# Patient Record
Sex: MALE | Race: WHITE | NOT HISPANIC OR LATINO | Employment: FULL TIME | URBAN - METROPOLITAN AREA
[De-identification: names, ages, dates, MRNs, and addresses within clinical notes are randomized per-mention and may not be internally consistent; named-entity substitution may affect disease eponyms.]

---

## 2017-01-12 ENCOUNTER — TELEPHONE (OUTPATIENT)
Dept: CARDIOLOGY | Facility: CLINIC | Age: 37
End: 2017-01-12

## 2017-01-12 NOTE — TELEPHONE ENCOUNTER
Pt called in with C/O left side chest pain that wraps under arm per Pt on scale  of 0-10 is a 2, and increased PVC's.  Pt says if he is busy he can forget about it but is there all the time. Pt says pushing on it does not change and movement does not change it. Pt denies any upper respiratory no coughing no illness. Pt told if pain increases SOB feels unwell go to ER. Pt has OV now 745 1/13/17.  JUAN mckenzie RN

## 2017-01-13 ENCOUNTER — OFFICE VISIT (OUTPATIENT)
Dept: CARDIOLOGY | Facility: CLINIC | Age: 37
End: 2017-01-13
Payer: COMMERCIAL

## 2017-01-13 VITALS
SYSTOLIC BLOOD PRESSURE: 104 MMHG | HEIGHT: 76 IN | DIASTOLIC BLOOD PRESSURE: 62 MMHG | BODY MASS INDEX: 28.98 KG/M2 | HEART RATE: 62 BPM | WEIGHT: 238 LBS

## 2017-01-13 DIAGNOSIS — Z86.69 HISTORY OF CHOLESTEATOMA: ICD-10-CM

## 2017-01-13 DIAGNOSIS — I51.7 CARDIOMEGALY: ICD-10-CM

## 2017-01-13 DIAGNOSIS — R07.1 CHEST PAIN ON BREATHING: ICD-10-CM

## 2017-01-13 DIAGNOSIS — I30.0 IDIOPATHIC PERICARDITIS, UNSPECIFIED CHRONICITY: ICD-10-CM

## 2017-01-13 DIAGNOSIS — I49.3 PVC'S (PREMATURE VENTRICULAR CONTRACTIONS): Primary | ICD-10-CM

## 2017-01-13 DIAGNOSIS — R42 DIZZINESS: ICD-10-CM

## 2017-01-13 LAB
CRP SERPL-MCNC: <2.9 MG/L (ref 0–8)
ERYTHROCYTE [SEDIMENTATION RATE] IN BLOOD BY WESTERGREN METHOD: 4 MM/H (ref 0–15)

## 2017-01-13 PROCEDURE — 93000 ELECTROCARDIOGRAM COMPLETE: CPT | Performed by: INTERNAL MEDICINE

## 2017-01-13 PROCEDURE — 85652 RBC SED RATE AUTOMATED: CPT | Performed by: INTERNAL MEDICINE

## 2017-01-13 PROCEDURE — 36415 COLL VENOUS BLD VENIPUNCTURE: CPT | Performed by: INTERNAL MEDICINE

## 2017-01-13 PROCEDURE — 86140 C-REACTIVE PROTEIN: CPT | Performed by: INTERNAL MEDICINE

## 2017-01-13 PROCEDURE — 99214 OFFICE O/P EST MOD 30 MIN: CPT | Performed by: INTERNAL MEDICINE

## 2017-01-13 NOTE — PROGRESS NOTES
2017      Yarely Gonsalves, BRINDA, CNP   [facility]   [address], [address]   [place]  [address]      RE: Earnest Peterson   MRN: [id number]   : 1980      Dear Ms. Gonsalves:      I had the pleasure of seeing Earnest Peterson in Cardiology Clinic today.  He is a 36-year-old male with a past medical history of frequent premature ventricular complexes who returns for a followup.  Over the last 2 weeks he has had some mild constant chest discomfort in the retrosternal area radiating to the left chest wall.  It is pleuritic in nature and does get worse with breathing.  It does not get worse with exertion.  Today, he is having some cough.  He has had no fever or chills.  In addition, he has had more dizziness and palpitations since I last saw him.  Over the last 3 weeks, dizziness has worsened.  He attributes that to the frequent PVCs.      PHYSICAL EXAMINATION:   VITAL SIGNS:  Blood pressure 104/62, pulse 62 per minute and slightly irregular.   CARDIAC:  Regular S1, S2 with frequent PVCs.     CHEST:  Clear to auscultation.      In the past, he had an echo in 10/26 which showed LV enlargement which was mild to moderate, although may be overestimated.  EF was low normal.  We were supposed to repeat an echocardiogram this April but because of his chest pain, he came earlier.  He has had an exercise stress echocardiogram which was negative for ischemia.      EKG done today revealed sinus rhythm with monomorphic PVCs, frequent.       IMPRESSION:   1.  Chest pain.  This is pleuritic in nature.  It could be mild pleurisy or pericarditis.  He has had no fever or chills.  I will check an ESR and CRP.  I have asked him to take Motrin around the clock every 8 hours for at least 2-3 days after food.  Hopefully, that will help this pain.  I would also suggest doing a cardiac MRI, both for the PVCs and pain to see if there is any inflammation of the pericardium.   2.  Frequent PVCs.  Now he is having more  symptoms.  He is having dizziness and more fatigue.  I suggested repeating a Holter to see if the burden of PVCs had increased.  We will also do a cardiac MRI to see if there is any other etiology for the PVCs including possibility of right ventricular dysplasia and other causes.  Having said that, the echocardiogram in October did not show any RV enlargement or dysfunction.  I am also looking to see if there is any new cardiomyopathy developing because of frequent PVCs.  If that is the case, he may benefit from aggressive therapy for PVCs, including ablation.  With that in mind, I will also set up an appointment with Dr. Castano in the EP Clinic after the cardiac MRI.  If the EF is low, he may need medications like Coreg and lisinopril if his blood pressure will tolerate.      PLAN:   1.  ESR and CRP today.   2.  Cardiac MRI with contrast to assess for PVCs as well as pericardial inflammation.   3.  A 24-hour Holter.   4.  See EP after that.   5.  See me next month.      Sincerely,      MD LORRAINE Metcalf MD             D: 2017 08:29   T: 2017 12:17   MT: VICTORINO      Name:     PRITI ARECHIGA   MRN:      [id number]        Account:      [id number]   :      1980           Service Date: 2017      Document: [id number]

## 2017-01-13 NOTE — Clinical Note
2017      Yarely Gonsalves, BRINDA, CNP   [facility]   [address], [address]   [place]  [address]      RE: Earnest Peterson   MRN: [id number]   : 1980      Dear Ms. Gonsalves:      I had the pleasure of seeing Earnest Peterson in Cardiology Clinic today.  He is a 36-year-old male with a past medical history of frequent premature ventricular complexes who returns for a followup.  Over the last 2 weeks he has had some mild constant chest discomfort in the retrosternal area radiating to the left chest wall.  It is pleuritic in nature and does get worse with breathing.  It does not get worse with exertion.  Today, he is having some cough.  He has had no fever or chills.  In addition, he has had more dizziness and palpitations since I last saw him.  Over the last 3 weeks, dizziness has worsened.  He attributes that to the frequent PVCs.      PHYSICAL EXAMINATION:   VITAL SIGNS:  Blood pressure 104/62, pulse 62 per minute and slightly irregular.   CARDIAC:  Regular S1, S2 with frequent PVCs.     CHEST:  Clear to auscultation.      In the past, he had an echo in 10/26 which showed LV enlargement which was mild to moderate, although may be overestimated.  EF was low normal.  We were supposed to repeat an echocardiogram this April but because of his chest pain, he came earlier.  He has had an exercise stress echocardiogram which was negative for ischemia.      EKG done today revealed sinus rhythm with monomorphic PVCs, frequent.       IMPRESSION:   1.  Chest pain.  This is pleuritic in nature.  It could be mild pleurisy or pericarditis.  He has had no fever or chills.  I will check an ESR and CRP.  I have asked him to take Motrin around the clock every 8 hours for at least 2-3 days after food.  Hopefully, that will help this pain.  I would also suggest doing a cardiac MRI, both for the PVCs and pain to see if there is any inflammation of the pericardium.   2.  Frequent PVCs.  Now he is having more  symptoms.  He is having dizziness and more fatigue.  I suggested repeating a Holter to see if the burden of PVCs had increased.  We will also do a cardiac MRI to see if there is any other etiology for the PVCs including possibility of right ventricular dysplasia and other causes.  Having said that, the echocardiogram in October did not show any RV enlargement or dysfunction.  I am also looking to see if there is any new cardiomyopathy developing because of frequent PVCs.  If that is the case, he may benefit from aggressive therapy for PVCs, including ablation.  With that in mind, I will also set up an appointment with Dr. Castano in the EP Clinic after the cardiac MRI.  If the EF is low, he may need medications like Coreg and lisinopril if his blood pressure will tolerate.      PLAN:   1.  ESR and CRP today.   2.  Cardiac MRI with contrast to assess for PVCs as well as pericardial inflammation.   3.  A 24-hour Holter.   4.  See EP after that.   5.  See me next month.      Sincerely,      Jose Cruz Lopez MD

## 2017-01-13 NOTE — PROGRESS NOTES
HPI and Plan:   See dictation  [id number]  Orders Placed This Encounter   Procedures     MRI Cardiac w/contrast     Erythrocyte sedimentation rate auto     CRP inflammation     Follow-Up with Electrophysiologist     Follow-Up with Cardiologist     EKG 12-lead complete w/read - Clinics (performed today)     Holter Monitor 24 hour - Adult       No orders of the defined types were placed in this encounter.       Medications Discontinued During This Encounter   Medication Reason     Probiotic Product (PROBIOTIC DAILY PO) Stopped by Patient         Encounter Diagnoses   Name Primary?     PVC's (premature ventricular contractions) Yes     History of cholesteatoma      Dizziness      Cardiomegaly      Chest pain on breathing      Idiopathic pericarditis, unspecified chronicity        CURRENT MEDICATIONS:  Current Outpatient Prescriptions   Medication Sig Dispense Refill     ibuprofen (ADVIL,MOTRIN) 800 MG tablet Take 1 tablet (800 mg) by mouth every 8 hours as needed for moderate pain (Patient taking differently: Take 200 mg by mouth every 8 hours as needed for moderate pain ) 30 tablet 0       ALLERGIES     Allergies   Allergen Reactions     Nkda [No Known Drug Allergies]        PAST MEDICAL HISTORY:  Past Medical History   Diagnosis Date     Allergic rhinitis      Recurrent cholesteatoma of postmastoidectomy cavity      PVC's (premature ventricular contractions)      Sinus tachycardia      Dizziness      Lightheadedness        PAST SURGICAL HISTORY:  Past Surgical History   Procedure Laterality Date     Mastoidectomy         FAMILY HISTORY:  Family History   Problem Relation Age of Onset     DIABETES Father      type 2     Hypertension Father      Myocardial Infarction Father      HEART DISEASE Father      Heart Surgery Father 52     bypass     Arrhythmia Father      Obesity Father      KIDNEY DISEASE Mother      Myocardial Infarction Maternal Grandfather      Family History Negative Paternal Grandmother      Family History  "Negative Paternal Grandfather      Family History Negative Brother      Family History Negative Maternal Grandmother        SOCIAL HISTORY:  Social History     Social History     Marital Status:      Spouse Name: N/A     Number of Children: N/A     Years of Education: N/A     Occupational History     Sales - Software.       Social History Main Topics     Smoking status: Never Smoker      Smokeless tobacco: Never Used     Alcohol Use: Yes      Comment: 1 glass wine, 4 days week     Drug Use: No     Sexual Activity:     Partners: Female     Other Topics Concern     Parent/Sibling W/ Cabg, Mi Or Angioplasty Before 65f 55m? Yes     father     Caffeine Concern No     1 cup per week     Sleep Concern No     Stress Concern Yes     Weight Concern No     Special Diet No     Exercise Yes     runs 1-2 days week     Social History Narrative       Review of Systems:  Skin:  Negative       Eyes:  Negative      ENT:  Positive for hearing loss;nasal congestion    Respiratory:  Positive for dyspnea on exertion;cough     Cardiovascular:    Positive for;palpitations;lightheadedness;dizziness;chest pain    Gastroenterology: Negative      Genitourinary:  Negative      Musculoskeletal:  Positive for back pain lower back oain  Neurologic:  Positive for numbness or tingling of feet L leg, thinks its from his back pain  Psychiatric:  Positive for anxiety anxiety with PVC's  Heme/Lymph/Imm:  Negative allergies    Endocrine:  Negative        Physical Exam:  Vitals: /62 mmHg  Pulse 62  Ht 1.93 m (6' 4\")  Wt 107.956 kg (238 lb)  BMI 28.98 kg/m2    Constitutional:  cooperative;alert and oriented        Skin:  warm and dry to the touch        Head:  no masses or lesions        Eyes:  pupils equal and round        ENT:  dentition good        Neck:  carotid pulses are full and equal bilaterally;JVP normal        Chest:  clear to auscultation          Cardiac: regular rhythm;normal S1 and S2 occasional premature beats S4 no presence " of murmur            Abdomen:  abdomen soft;BS normoactive        Vascular: not assessed this visit                                        Extremities and Back:  no edema              Neurological:  no gross motor deficits              CC  No referring provider defined for this encounter.

## 2017-01-19 ENCOUNTER — HOSPITAL ENCOUNTER (OUTPATIENT)
Dept: CARDIOLOGY | Facility: CLINIC | Age: 37
Discharge: HOME | End: 2017-01-19
Attending: INTERNAL MEDICINE | Admitting: INTERNAL MEDICINE
Payer: COMMERCIAL

## 2017-01-19 DIAGNOSIS — I49.3 PVC'S (PREMATURE VENTRICULAR CONTRACTIONS): ICD-10-CM

## 2017-01-19 PROCEDURE — 93225 XTRNL ECG REC<48 HRS REC: CPT

## 2017-01-19 PROCEDURE — 93227 XTRNL ECG REC<48 HR R&I: CPT | Performed by: INTERNAL MEDICINE

## 2017-01-24 ENCOUNTER — HOSPITAL ENCOUNTER (OUTPATIENT)
Dept: CARDIOLOGY | Facility: CLINIC | Age: 37
Discharge: HOME | End: 2017-01-24
Attending: INTERNAL MEDICINE | Admitting: INTERNAL MEDICINE
Payer: COMMERCIAL

## 2017-01-24 DIAGNOSIS — R07.1 CHEST PAIN ON BREATHING: ICD-10-CM

## 2017-01-24 DIAGNOSIS — I30.0 IDIOPATHIC PERICARDITIS, UNSPECIFIED CHRONICITY: ICD-10-CM

## 2017-01-24 DIAGNOSIS — I49.3 PVC'S (PREMATURE VENTRICULAR CONTRACTIONS): ICD-10-CM

## 2017-01-24 PROCEDURE — 75561 CARDIAC MRI FOR MORPH W/DYE: CPT | Mod: 26 | Performed by: INTERNAL MEDICINE

## 2017-01-24 PROCEDURE — 25500064 ZZH RX 255 OP 636: Performed by: INTERNAL MEDICINE

## 2017-01-24 PROCEDURE — A9585 GADOBUTROL INJECTION: HCPCS | Performed by: INTERNAL MEDICINE

## 2017-01-24 PROCEDURE — 75561 CARDIAC MRI FOR MORPH W/DYE: CPT

## 2017-01-24 RX ORDER — GADOBUTROL 604.72 MG/ML
5-65 INJECTION INTRAVENOUS ONCE
Status: COMPLETED | OUTPATIENT
Start: 2017-01-24 | End: 2017-01-24

## 2017-01-24 RX ORDER — DIPHENHYDRAMINE HCL 25 MG
25 CAPSULE ORAL
Status: DISCONTINUED | OUTPATIENT
Start: 2017-01-24 | End: 2017-01-25 | Stop reason: HOSPADM

## 2017-01-24 RX ORDER — ACYCLOVIR 200 MG/1
0-1 CAPSULE ORAL
Status: DISCONTINUED | OUTPATIENT
Start: 2017-01-24 | End: 2017-01-25 | Stop reason: HOSPADM

## 2017-01-24 RX ORDER — METHYLPREDNISOLONE SODIUM SUCCINATE 125 MG/2ML
125 INJECTION, POWDER, LYOPHILIZED, FOR SOLUTION INTRAMUSCULAR; INTRAVENOUS
Status: DISCONTINUED | OUTPATIENT
Start: 2017-01-24 | End: 2017-01-25 | Stop reason: HOSPADM

## 2017-01-24 RX ORDER — ONDANSETRON 2 MG/ML
4 INJECTION INTRAMUSCULAR; INTRAVENOUS
Status: DISCONTINUED | OUTPATIENT
Start: 2017-01-24 | End: 2017-01-25 | Stop reason: HOSPADM

## 2017-01-24 RX ORDER — DIAZEPAM 5 MG
5 TABLET ORAL EVERY 30 MIN PRN
Status: DISCONTINUED | OUTPATIENT
Start: 2017-01-24 | End: 2017-01-25 | Stop reason: HOSPADM

## 2017-01-24 RX ORDER — DIPHENHYDRAMINE HYDROCHLORIDE 50 MG/ML
25-50 INJECTION INTRAMUSCULAR; INTRAVENOUS
Status: DISCONTINUED | OUTPATIENT
Start: 2017-01-24 | End: 2017-01-25 | Stop reason: HOSPADM

## 2017-01-24 RX ADMIN — GADOBUTROL 14 ML: 604.72 INJECTION INTRAVENOUS at 13:00

## 2017-01-25 ENCOUNTER — TELEPHONE (OUTPATIENT)
Dept: LAB | Facility: CLINIC | Age: 37
End: 2017-01-25

## 2017-01-25 DIAGNOSIS — I49.3 VENTRICULAR PREMATURE CONTRACTIONS: Primary | ICD-10-CM

## 2017-01-25 DIAGNOSIS — I49.3 VENTRICULAR PREMATURE BEATS: Primary | ICD-10-CM

## 2017-01-25 NOTE — TELEPHONE ENCOUNTER
Called Pt and informed him DR Lopez would like some further testing done, labs and chest X Ray. Orders in epic and Pt transferred to scheduling.  Pt sees DR Castano 1/31/17. JUAN Curtis RN

## 2017-01-25 NOTE — TELEPHONE ENCOUNTER
Lets draw ESR, CRP and serum angiotensin converting enzyme level as well as CXR to assess for possible sarcoid. Is his chest pain better? When does he see EP? Let him know that EF is low normal.

## 2017-01-25 NOTE — TELEPHONE ENCOUNTER
OV 3/6/17-IMPRESSION:  1.  Normal left ventricular size and low normal systolic function with  a calculated ejection fraction of  53 %. There are no regional wall  motion abnormalities.  2.  Normal right ventricular size and systolic function with a  calculated ejection fraction of 51%.    3.  There is no evidence of myocardial edema on T2-weighted imaging.  4.  On delayed enhancement imaging, there is sub-epicardial and mid  wall late enhancement involving the basal anterolateral and  inferolateral walls, the mid inferolateral/inferoseptal/anteroseptal  walls and the apical septal wall. The differential for this pattern of  enhancement would include myocarditis (given the patient's chest  discomfort) or an infiltrative cardiac process such as cardiac  sarcoidosis. Left dominant arrhythmogenic cardiomyopathy could also be  considered in the differential given the patient's frequent PVCs but  is less likely.  5.  There is no evidence of pericardial inflammation or ventricular  interdependence on free breathing cine images

## 2017-01-27 ENCOUNTER — HOSPITAL ENCOUNTER (OUTPATIENT)
Dept: GENERAL RADIOLOGY | Facility: CLINIC | Age: 37
Discharge: HOME | End: 2017-01-27
Attending: INTERNAL MEDICINE | Admitting: INTERNAL MEDICINE
Payer: COMMERCIAL

## 2017-01-27 DIAGNOSIS — I49.3 VENTRICULAR PREMATURE BEATS: ICD-10-CM

## 2017-01-27 DIAGNOSIS — I49.3 VENTRICULAR PREMATURE CONTRACTIONS: ICD-10-CM

## 2017-01-27 LAB
CRP SERPL-MCNC: 8.2 MG/L (ref 0–8)
ERYTHROCYTE [SEDIMENTATION RATE] IN BLOOD BY WESTERGREN METHOD: 6 MM/H (ref 0–15)

## 2017-01-27 PROCEDURE — 82164 ANGIOTENSIN I ENZYME TEST: CPT | Mod: 90 | Performed by: INTERNAL MEDICINE

## 2017-01-27 PROCEDURE — 86140 C-REACTIVE PROTEIN: CPT | Performed by: INTERNAL MEDICINE

## 2017-01-27 PROCEDURE — 71020 XR CHEST 2 VW: CPT

## 2017-01-27 PROCEDURE — 85652 RBC SED RATE AUTOMATED: CPT | Performed by: INTERNAL MEDICINE

## 2017-01-27 PROCEDURE — 99000 SPECIMEN HANDLING OFFICE-LAB: CPT | Performed by: INTERNAL MEDICINE

## 2017-01-27 PROCEDURE — 36415 COLL VENOUS BLD VENIPUNCTURE: CPT | Performed by: INTERNAL MEDICINE

## 2017-01-28 LAB — ACE SERPL-CCNC: 36 U/L

## 2017-01-30 ENCOUNTER — TELEPHONE (OUTPATIENT)
Dept: CARDIOLOGY | Facility: CLINIC | Age: 37
End: 2017-01-30

## 2017-01-30 NOTE — TELEPHONE ENCOUNTER
Angiotensin Converting Enzyme      36     Comment:      Reference range: 9 to 67        Component      Latest Ref Rng 1/27/2017   Sed Rate      0 - 15 mm/h 6     Component      Latest Ref Rng 1/27/2017   CRP Inflammation      0.0 - 8.0 mg/L 8.2 (H)   OV 3/6/17

## 2017-01-31 ENCOUNTER — OFFICE VISIT (OUTPATIENT)
Dept: CARDIOLOGY | Facility: CLINIC | Age: 37
End: 2017-01-31
Attending: INTERNAL MEDICINE
Payer: COMMERCIAL

## 2017-01-31 VITALS
DIASTOLIC BLOOD PRESSURE: 72 MMHG | HEIGHT: 76 IN | SYSTOLIC BLOOD PRESSURE: 104 MMHG | WEIGHT: 235 LBS | BODY MASS INDEX: 28.62 KG/M2 | HEART RATE: 72 BPM

## 2017-01-31 DIAGNOSIS — I49.3 PVC'S (PREMATURE VENTRICULAR CONTRACTIONS): ICD-10-CM

## 2017-01-31 DIAGNOSIS — R07.1 CHEST PAIN ON BREATHING: ICD-10-CM

## 2017-01-31 PROCEDURE — 99204 OFFICE O/P NEW MOD 45 MIN: CPT | Performed by: INTERNAL MEDICINE

## 2017-01-31 NOTE — PROGRESS NOTES
HPI and Plan:   See dictation    Orders Placed This Encounter   Procedures     EP Ablation Procedures       No orders of the defined types were placed in this encounter.       There are no discontinued medications.      Encounter Diagnoses   Name Primary?     PVC's (premature ventricular contractions)      Chest pain on breathing        CURRENT MEDICATIONS:  Current Outpatient Prescriptions   Medication Sig Dispense Refill     ibuprofen (ADVIL,MOTRIN) 800 MG tablet Take 1 tablet (800 mg) by mouth every 8 hours as needed for moderate pain (Patient taking differently: Take 200 mg by mouth every 8 hours as needed for moderate pain ) 30 tablet 0       ALLERGIES     Allergies   Allergen Reactions     Nkda [No Known Drug Allergies]        PAST MEDICAL HISTORY:  Past Medical History   Diagnosis Date     Allergic rhinitis      Recurrent cholesteatoma of postmastoidectomy cavity      PVC's (premature ventricular contractions)        PAST SURGICAL HISTORY:  Past Surgical History   Procedure Laterality Date     Mastoidectomy         FAMILY HISTORY:  Family History   Problem Relation Age of Onset     DIABETES Father      type 2     Hypertension Father      Myocardial Infarction Father      HEART DISEASE Father      Heart Surgery Father 52     bypass     Arrhythmia Father      Obesity Father      KIDNEY DISEASE Mother      Myocardial Infarction Maternal Grandfather      Family History Negative Paternal Grandmother      Family History Negative Paternal Grandfather      Family History Negative Brother      Family History Negative Maternal Grandmother        SOCIAL HISTORY:  Social History     Social History     Marital Status:      Spouse Name: N/A     Number of Children: N/A     Years of Education: N/A     Occupational History     Sales - Software.       Social History Main Topics     Smoking status: Never Smoker      Smokeless tobacco: Never Used     Alcohol Use: Yes      Comment: 1 glass wine, 4 days week     Drug Use:  "No     Sexual Activity:     Partners: Female     Other Topics Concern     Parent/Sibling W/ Cabg, Mi Or Angioplasty Before 65f 55m? Yes     father     Caffeine Concern No     1 cup per week     Sleep Concern No     Stress Concern Yes     Weight Concern No     Special Diet No     Exercise Yes     runs 1-2 days week     Social History Narrative       Review of Systems:  Skin:  Negative       Eyes:  Negative      ENT:  Positive for hearing loss;nasal congestion    Respiratory:  Positive for dyspnea on exertion;cough     Cardiovascular:    Positive for;palpitations;lightheadedness;dizziness;chest pain    Gastroenterology: Negative      Genitourinary:  Negative      Musculoskeletal:  Positive for back pain lower back oain  Neurologic:  Positive for numbness or tingling of feet L leg, thinks its from his back pain  Psychiatric:  Positive for anxiety anxiety with PVC's  Heme/Lymph/Imm:  Negative allergies    Endocrine:  Negative        Physical Exam:  Vitals: /72 mmHg  Pulse 72  Ht 1.93 m (6' 4\")  Wt 106.595 kg (235 lb)  BMI 28.62 kg/m2    Constitutional:  cooperative;alert and oriented        Skin:  warm and dry to the touch        Head:  no masses or lesions        Eyes:  pupils equal and round        ENT:  dentition good        Neck:  carotid pulses are full and equal bilaterally;JVP normal        Chest:  clear to auscultation          Cardiac: regular rhythm;normal S1 and S2 occasional premature beats S4 no presence of murmur            Abdomen:  abdomen soft;BS normoactive        Vascular: not assessed this visit                                        Extremities and Back:  no edema              Neurological:  no gross motor deficits              CAROLIN Lopez MD   [facility]  [address]  [address]  [place] [address]                "

## 2017-01-31 NOTE — PROGRESS NOTES
2017             BRINDA Harper, CNP   [facility]   [address]   [place] [address]       RE:    Earnest Peterson   MRN:  [id number]   :  1980      Dear Ms. Holguins:      I saw Mr. Peterson for evaluation of persistent frequent symptomatic PVCs.  He is a 36-year-old white male who had symptoms of dizziness and palpitations in early .  He was initially evaluated by Dr. Lopez and was found to have frequent PVCs.  His echocardiography showed mildly enlarged left ventricle, but the LV function is normal.  Unfortunately, his symptoms continue up to this point.  He has been tested with stress echocardiography that showed no evidence of myocardial ischemia.      The recent Holter showed PVCs over 18,000 in 24 hours.  The previous Holter in 2015 also showed PVCs over 17,000 in 24 hours.  I personally reviewed the Holter tracings and identified predominantly 1 type of PVC.  So far he has not had syncope but he did have episodes of near-syncope.      The patient does not have other medical conditions.  His family history is remarkable for his father to having coronary artery disease and atrial fibrillation.  His brother is healthy.      The patient does not smoke or abuse alcohol.  The PVCs have no apparent triggers.      PHYSICAL EXAMINATION:   VITAL SIGNS:  Blood pressure was 104/72, heart rate 72 beats per minute, body weight 235 pounds.   HEENT:  The eyes and ENT were unremarkable.   LUNGS:  Clear.   CARDIAC:  The cardiac rhythm was irregular with frequent premature contractions.  The heart sounds were normal without murmur.   ABDOMEN:  No hepatomegaly.   EXTREMITIES:  There was no pedal edema.      EKG on  showed PVCs suggesting possible RV outflow tract origin.      He is a cardiac MRI showed mild delayed enhancement over the anterior upper septal region.      ASSESSMENT AND RECOMMENDATIONS:  Mr. Peterson is a 36-year-old white male with frequent persistent symptomatic  PVCs.  The echocardiography showed mild LV enlargement, suggesting that the PVC might have caused some heart dysfunction.  His MRI showed suspicious mild delayed enhancement, but the connection of the MRI finding to his PVC remains uncertain at the present time.  Because of his young age and his personal preference, ablation is recommended.  He does not want to take lifetime medications.  The risks and benefits of PVC ablation were explained to the patient who expressed understanding and consented for the procedure.      Sincerely,         MD JANICE Mora MD             D: 2017 10:46   T: 2017 12:00   MT: SOTERO      Name:     PRITI ARECHIGA   MRN:      [id number]        Account:      [id number]   :      1980           Service Date: 2017      Document: [id number]

## 2017-01-31 NOTE — Clinical Note
2017             BRINDA Harper, CNP   [facility]   [address]   [place] [address]       RE:    Earnest Peterson   MRN:  [id number]   :  1980      Dear Ms. Holguins:      I saw Mr. Peterson for evaluation of persistent frequent symptomatic PVCs.  He is a 36-year-old white male who had symptoms of dizziness and palpitations in early .  He was initially evaluated by Dr. Lopez and was found to have frequent PVCs.  His echocardiography showed mildly enlarged left ventricle, but the LV function is normal.  Unfortunately, his symptoms continue up to this point.  He has been tested with stress echocardiography that showed no evidence of myocardial ischemia.      The recent Holter showed PVCs over 18,000 in 24 hours.  The previous Holter in 2015 also showed PVCs over 17,000 in 24 hours.  I personally reviewed the Holter tracings and identified predominantly 1 type of PVC.  So far he has not had syncope but he did have episodes of near-syncope.      The patient does not have other medical conditions.  His family history is remarkable for his father to having coronary artery disease and atrial fibrillation.  His brother is healthy.      The patient does not smoke or abuse alcohol.  The PVCs have no apparent triggers.      PHYSICAL EXAMINATION:   VITAL SIGNS:  Blood pressure was 104/72, heart rate 72 beats per minute, body weight 235 pounds.   HEENT:  The eyes and ENT were unremarkable.   LUNGS:  Clear.   CARDIAC:  The cardiac rhythm was irregular with frequent premature contractions.  The heart sounds were normal without murmur.   ABDOMEN:  No hepatomegaly.   EXTREMITIES:  There was no pedal edema.      EKG on  showed PVCs suggesting possible RV outflow tract origin.      He is a cardiac MRI showed mild delayed enhancement over the anterior upper septal region.      ASSESSMENT AND RECOMMENDATIONS:  Mr. Peterson is a 36-year-old white male with frequent persistent symptomatic  PVCs.  The echocardiography showed mild LV enlargement, suggesting that the PVC might have caused some heart dysfunction.  His MRI showed suspicious mild delayed enhancement, but the connection of the MRI finding to his PVC remains uncertain at the present time.  Because of his young age and his personal preference, ablation is recommended.  He does not want to take lifetime medications.  The risks and benefits of PVC ablation were explained to the patient who expressed understanding and consented for the procedure.      Sincerely,         Hugo Castano MD

## 2017-02-07 ENCOUNTER — TELEPHONE (OUTPATIENT)
Dept: CARDIOLOGY | Facility: CLINIC | Age: 37
End: 2017-02-07

## 2017-02-07 NOTE — TELEPHONE ENCOUNTER
Called Pt and reviewed lab results. CRP elevated other labs normal- Pt has OV with DR Lopez schedule Mar 6 two days before scheduled ablation. Will message  If he still wants to see Pt and any recommendations regarding elevated CRP.  CRP Inflammation 8.2 (H) 0.0 - 8.0 mg/L   JUAN Curtis RN

## 2017-02-08 DIAGNOSIS — I49.3 MULTIFOCAL PVCS: Primary | ICD-10-CM

## 2017-02-08 DIAGNOSIS — I49.3 PVC'S (PREMATURE VENTRICULAR CONTRACTIONS): Primary | ICD-10-CM

## 2017-02-08 NOTE — TELEPHONE ENCOUNTER
Called Pt left message informing him would like to recheck CRP and order in epic for him to arrange lab. JUAN Curtis RN

## 2017-02-23 ENCOUNTER — TELEPHONE (OUTPATIENT)
Dept: CARDIOLOGY | Facility: CLINIC | Age: 37
End: 2017-02-23

## 2017-02-23 DIAGNOSIS — I49.3 PVC'S (PREMATURE VENTRICULAR CONTRACTIONS): ICD-10-CM

## 2017-02-23 LAB — CRP SERPL-MCNC: <2.9 MG/L (ref 0–8)

## 2017-02-23 PROCEDURE — 36415 COLL VENOUS BLD VENIPUNCTURE: CPT | Performed by: INTERNAL MEDICINE

## 2017-02-23 PROCEDURE — 86140 C-REACTIVE PROTEIN: CPT | Performed by: INTERNAL MEDICINE

## 2017-02-23 NOTE — TELEPHONE ENCOUNTER
Informed Pt that CRP back to normal. Pt still has pain with deep breaths under left arm wrapping to back but nothing constant and quite tolerable. Pt asking if he still needs OV with DR Lopez 3/6/17. Pt's ablation is 3/8/16. JUAN Curtis RN

## 2017-03-06 ENCOUNTER — OFFICE VISIT (OUTPATIENT)
Dept: CARDIOLOGY | Facility: CLINIC | Age: 37
End: 2017-03-06
Attending: INTERNAL MEDICINE
Payer: COMMERCIAL

## 2017-03-06 VITALS
DIASTOLIC BLOOD PRESSURE: 75 MMHG | HEIGHT: 76 IN | WEIGHT: 237 LBS | HEART RATE: 80 BPM | BODY MASS INDEX: 28.86 KG/M2 | SYSTOLIC BLOOD PRESSURE: 130 MMHG

## 2017-03-06 DIAGNOSIS — R07.2 PRECORDIAL PAIN: ICD-10-CM

## 2017-03-06 DIAGNOSIS — I49.3 PVC'S (PREMATURE VENTRICULAR CONTRACTIONS): Primary | ICD-10-CM

## 2017-03-06 PROCEDURE — 99214 OFFICE O/P EST MOD 30 MIN: CPT | Performed by: INTERNAL MEDICINE

## 2017-03-06 NOTE — PROGRESS NOTES
HPI and Plan:   See dictation  [id number]  Orders Placed This Encounter   Procedures     Follow-Up with Cardiologist       No orders of the defined types were placed in this encounter.      There are no discontinued medications.      Encounter Diagnoses   Name Primary?     PVC's (premature ventricular contractions) Yes     Precordial pain        CURRENT MEDICATIONS:  Current Outpatient Prescriptions   Medication Sig Dispense Refill     ibuprofen (ADVIL,MOTRIN) 800 MG tablet Take 1 tablet (800 mg) by mouth every 8 hours as needed for moderate pain (Patient taking differently: Take 200 mg by mouth every 8 hours as needed for moderate pain ) 30 tablet 0       ALLERGIES     Allergies   Allergen Reactions     Nkda [No Known Drug Allergies]        PAST MEDICAL HISTORY:  Past Medical History   Diagnosis Date     Allergic rhinitis      PVC's (premature ventricular contractions)      Recurrent cholesteatoma of postmastoidectomy cavity        PAST SURGICAL HISTORY:  Past Surgical History   Procedure Laterality Date     Mastoidectomy         FAMILY HISTORY:  Family History   Problem Relation Age of Onset     DIABETES Father      type 2     Hypertension Father      Myocardial Infarction Father      HEART DISEASE Father      Heart Surgery Father 52     bypass     Arrhythmia Father      Obesity Father      KIDNEY DISEASE Mother      Myocardial Infarction Maternal Grandfather      Family History Negative Paternal Grandmother      Family History Negative Paternal Grandfather      Family History Negative Brother      Family History Negative Maternal Grandmother        SOCIAL HISTORY:  Social History     Social History     Marital status:      Spouse name: N/A     Number of children: N/A     Years of education: N/A     Occupational History     Sales - Software.       Social History Main Topics     Smoking status: Never Smoker     Smokeless tobacco: Never Used     Alcohol use Yes      Comment: 1 glass wine, 4 days week     Drug  "use: No     Sexual activity: Yes     Partners: Female     Other Topics Concern     Parent/Sibling W/ Cabg, Mi Or Angioplasty Before 65f 55m? Yes     father     Caffeine Concern No     1 cup per week     Sleep Concern No     Stress Concern Yes     Weight Concern No     Special Diet No     Exercise Yes     runs 1-2 days week     Social History Narrative       Review of Systems:  Skin:  Negative       Eyes:  Negative      ENT:  Positive for hearing loss;nasal congestion    Respiratory:  Positive for dyspnea on exertion;cough     Cardiovascular:    Positive for;palpitations;lightheadedness;dizziness    Gastroenterology: Negative      Genitourinary:  Negative      Musculoskeletal:  Positive for back pain lower back oain  Neurologic:  Positive for numbness or tingling of feet L leg, thinks its from his back pain  Psychiatric:  Positive for anxiety anxiety with PVC's  Heme/Lymph/Imm:  Negative allergies    Endocrine:  Negative        Physical Exam:  Vitals: /75  Pulse 80  Ht 1.93 m (6' 4\")  Wt 107.5 kg (237 lb)  BMI 28.85 kg/m2    Constitutional:  cooperative;alert and oriented        Skin:  warm and dry to the touch        Head:  no masses or lesions        Eyes:  pupils equal and round        ENT:  dentition good        Neck:  carotid pulses are full and equal bilaterally;JVP normal        Chest:  clear to auscultation          Cardiac: regular rhythm;normal S1 and S2 occasional premature beats S4 no presence of murmur            Abdomen:  abdomen soft;BS normoactive        Vascular: not assessed this visit                                        Extremities and Back:  no edema              Neurological:  no gross motor deficits              CAROLIN Lopez MD   [facility]  [address]  [address]  [place] [address]              "

## 2017-03-06 NOTE — LETTER
01-Mar-2017 13:55 3/6/2017    Yarely Gonsalves, BRINDA CNP  [facility]   [address] [facility] [address]  [facility] [address]    RE: Earnest KHARI Peterson       Dear Colleague,    I had the pleasure of seeing Earnest Peterson in Cardiology Clinic for symptomatic PVCs.  He has had PVCs for now more than a year.  He has had echocardiography done which showed low normal ejection fraction with mildly enlarged left ventricle.  He has also had almost atypical constant chest pain on the left side, occasionally increasing on taking a deep breath.  I did a cardiac MRI because of frequency of PVCs, which showed EF of 53% with normal LV size.  However, there was evidence of delayed enhancement that was atypical consistent with either previous myocarditis, sarcoid was in the differential as was left-sided arrhythmogenic cardiomyopathy, although the reader felt the latter was less likely.  We did ESR and CRP.  ESR was normal, and CRP was borderline elevated, but repeat CRP came back normal.  This was done to see if there is any evidence of active inflammation.  I also did ACE enzyme assessment for possible sarcoid as well as chest x-ray, which both were negative.  At this time, patient tells me that he still feels his PVCs.  He has seen my partner, Dr. Castano, who has recommended ablation given his symptoms as well as mild LV enlargement on echo, although not confirmed on MRI.  I gave him the results of the ESR, CRP, which are now normal.  He has taken ibuprofen intermittently, which occasionally helped the pain.  However, now in retrospect, he believes that some of the pain is related to how he sleeps.  He was on vacation last week and had a different bed, and his pain got worse because the bed was not comfortable.  For the last few days, his pain does not get worse with deep breathing.      PHYSICAL EXAMINATION:   VITAL SIGNS:  Blood pressure 130/75, pulse 80 per minute with slight irregularity due to PVCs.   CHEST:  Clear to auscultation.     Outpatient  Encounter Prescriptions as of 3/6/2017   Medication Sig Dispense Refill     ibuprofen (ADVIL,MOTRIN) 800 MG tablet Take 1 tablet (800 mg) by mouth every 8 hours as needed for moderate pain (Patient taking differently: Take 200 mg by mouth every 8 hours as needed for moderate pain ) 30 tablet 0     No facility-administered encounter medications on file as of 3/6/2017.       IMPRESSION:   1.  Symptomatic PVCs.  The patient is aware of the symptoms, has some tiredness, fatigue and had mild LV enlargement on the last echocardiogram, although the MRI showed normal LV size with low normal EF.  Given the symptoms, he has been advised ablation by Dr. Castano and it is being performed on Wednesday.  At this time, his ESR and CRP are normal and therefore no evidence of active inflammation.  We did do a limited workup for possible sarcoid including chest x-ray that did not show any lymphadenopathy.  There was also an ACE enzyme done which was within normal limits.  In any case, his MRI shows delayed enhancement, which is scarring and nothing active.  I did bring up the possibility of occasionally doing a heart biopsy for further assessment, but he is not interested.  At this time, we will proceed with the ablation per Dr. Castano's recommendation.  I told him Dr. Castano will see him after for followup to see how he is doing.  I would like to see him back in followup in 4 months.  I did review with him the previous stress echocardiogram in 2015 which showed no ischemia and the PVCs improved with peak exercise.      Thank you for allowing us to participate in the care of this nice patient.  At this time, we will see him back in followup in 4 months.  We will continue to follow with Dr. Castano for ablation and subsequent clinic visits.     Sincerely,    Jose Cruz Lopez MD     [facility]

## 2017-03-06 NOTE — PROGRESS NOTES
HISTORY OF PRESENT ILLNESS:  I had the pleasure of seeing Earnest Peterson in Cardiology Clinic for symptomatic PVCs.  He has had PVCs for now more than a year.  He has had echocardiography done which showed low normal ejection fraction with mildly enlarged left ventricle.  He has also had almost atypical constant chest pain on the left side, occasionally increasing on taking a deep breath.  I did a cardiac MRI because of frequency of PVCs, which showed EF of 53% with normal LV size.  However, there was evidence of delayed enhancement that was atypical consistent with either previous myocarditis, sarcoid was in the differential as was left-sided arrhythmogenic cardiomyopathy, although the reader felt the latter was less likely.  We did ESR and CRP.  ESR was normal, and CRP was borderline elevated, but repeat CRP came back normal.  This was done to see if there is any evidence of active inflammation.  I also did ACE enzyme assessment for possible sarcoid as well as chest x-ray, which both were negative.  At this time, patient tells me that he still feels his PVCs.  He has seen my partner, Dr. Castano, who has recommended ablation given his symptoms as well as mild LV enlargement on echo, although not confirmed on MRI.  I gave him the results of the ESR, CRP, which are now normal.  He has taken ibuprofen intermittently, which occasionally helped the pain.  However, now in retrospect, he believes that some of the pain is related to how he sleeps.  He was on vacation last week and had a different bed, and his pain got worse because the bed was not comfortable.  For the last few days, his pain does not get worse with deep breathing.      PHYSICAL EXAMINATION:   VITAL SIGNS:  Blood pressure 130/75, pulse 80 per minute with slight irregularity due to PVCs.   CHEST:  Clear to auscultation.      IMPRESSION:   1.  Symptomatic PVCs.  The patient is aware of the symptoms, has some tiredness, fatigue and had mild LV enlargement on the  last echocardiogram, although the MRI showed normal LV size with low normal EF.  Given the symptoms, he has been advised ablation by Dr. Castano and it is being performed on Wednesday.  At this time, his ESR and CRP are normal and therefore no evidence of active inflammation.  We did do a limited workup for possible sarcoid including chest x-ray that did not show any lymphadenopathy.  There was also an ACE enzyme done which was within normal limits.  In any case, his MRI shows delayed enhancement, which is scarring and nothing active.  I did bring up the possibility of occasionally doing a heart biopsy for further assessment, but he is not interested.  At this time, we will proceed with the ablation per Dr. Castano's recommendation.  I told him Dr. Castano will see him after for followup to see how he is doing.  I would like to see him back in followup in 4 months.  I did review with him the previous stress echocardiogram in  which showed no ischemia and the PVCs improved with peak exercise.      Thank you for allowing us to participate in the care of this nice patient.  At this time, we will see him back in followup in 4 months.  We will continue to follow with Dr. Castano for ablation and subsequent clinic visits.      cc:   BRINDA Harper, CNP    [address], [address]    [place]  [address]         LORRAINE TRINIDAD MD             D: 2017 08:50   T: 2017 13:45   MT: NELSON      Name:     PRITI ARECHIGA   MRN:      [id number]        Account:      [id number]   :      1980           Service Date: 2017      Document: [id number]

## 2017-03-06 NOTE — MR AVS SNAPSHOT
After Visit Summary   3/6/2017    Earnest Peterson    MRN: [id number]           Patient Information     Date Of Birth          1980        Visit Information        Provider Department      3/6/2017 8:15 AM Jose Cruz Lopez MD [facility] [place]        Today's Diagnoses     PVC's (premature ventricular contractions)    -  1    Precordial pain           Follow-ups after your visit        Additional Services     Follow-Up with Cardiologist                 Your next 10 appointments already scheduled     Mar 08, 2017  8:30 AM CST   Ep 90 Minute with SHCVR3   [facility] ([facility])    [address]  [place] [address]   [phone]              Future tests that were ordered for you today     Open Future Orders        Priority Expected Expires Ordered    Follow-Up with Cardiologist Routine 7/4/2017 3/6/2018 3/6/2017            Who to contact     If you have questions or need follow up information about today's clinic visit or your schedule please contact [facility] directly at [phone].  Normal or non-critical lab and imaging results will be communicated to you by MobiKwikhart, letter or phone within 4 business days after the clinic has received the results. If you do not hear from us within 7 days, please contact the clinic through MobiKwikhart or phone. If you have a critical or abnormal lab result, we will notify you by phone as soon as possible.  Submit refill requests through RoomReveal or call your pharmacy and they will forward the refill request to us. Please allow 3 business days for your refill to be completed.          Additional Information About Your Visit        MobiKwikhart Information     RoomReveal gives you secure access to your electronic health record. If you see a primary care provider, you can also send messages to your care team and make appointments. If you  "have questions, please call your primary care clinic.  If you do not have a primary care provider, please call [phone] and they will assist you.        Care EveryWhere ID     This is your Care EveryWhere ID. This could be used by other organizations to access your [place] medical records  [id number]        Your Vitals Were     Pulse Height BMI (Body Mass Index)             80 1.93 m (6' 4\") 28.85 kg/m2          Blood Pressure from Last 3 Encounters:   03/06/17 130/75   01/31/17 104/72   01/13/17 104/62    Weight from Last 3 Encounters:   03/06/17 107.5 kg (237 lb)   01/31/17 106.6 kg (235 lb)   01/13/17 108 kg (238 lb)              We Performed the Following     Follow-Up with Cardiologist          Today's Medication Changes          These changes are accurate as of: 3/6/17  8:52 AM.  If you have any questions, ask your nurse or doctor.               These medicines have changed or have updated prescriptions.        Dose/Directions    ibuprofen 800 MG tablet   Commonly known as:  ADVIL/MOTRIN   This may have changed:  how much to take   Used for:  Upper back pain on left side        Dose:  800 mg   Take 1 tablet (800 mg) by mouth every 8 hours as needed for moderate pain   Quantity:  30 tablet   Refills:  0                Primary Care Provider Office Phone # Fax #    BRINDA Olson [facility] [phone]       [facility] [address] [address]        Thank you!     Thank you for choosing [facility] [place]  for your care. Our goal is always to provide you with excellent care. Hearing back from our patients is one way we can continue to improve our services. Please take a few minutes to complete the written survey that you may receive in the mail after your visit with us. Thank you!             Your Updated Medication List - Protect others around you: Learn how to safely use, store and throw away your medicines at www.disposemymeds.org. "          This list is accurate as of: 3/6/17  8:52 AM.  Always use your most recent med list.                   Brand Name Dispense Instructions for use    ibuprofen 800 MG tablet    ADVIL/MOTRIN    30 tablet    Take 1 tablet (800 mg) by mouth every 8 hours as needed for moderate pain

## 2017-03-07 DIAGNOSIS — I49.3 PVC'S (PREMATURE VENTRICULAR CONTRACTIONS): Primary | ICD-10-CM

## 2017-03-07 RX ORDER — LIDOCAINE 40 MG/G
CREAM TOPICAL
Status: CANCELLED | OUTPATIENT
Start: 2017-03-07

## 2017-03-07 RX ORDER — SODIUM CHLORIDE 450 MG/100ML
INJECTION, SOLUTION INTRAVENOUS CONTINUOUS
Status: CANCELLED | OUTPATIENT
Start: 2017-03-07

## 2017-03-08 ENCOUNTER — APPOINTMENT (OUTPATIENT)
Dept: CARDIOLOGY | Facility: CLINIC | Age: 37
End: 2017-03-08
Attending: INTERNAL MEDICINE
Payer: COMMERCIAL

## 2017-03-08 ENCOUNTER — HOSPITAL ENCOUNTER (OUTPATIENT)
Facility: CLINIC | Age: 37
Discharge: HOME | End: 2017-03-08
Attending: INTERNAL MEDICINE | Admitting: INTERNAL MEDICINE
Payer: COMMERCIAL

## 2017-03-08 VITALS
SYSTOLIC BLOOD PRESSURE: 126 MMHG | OXYGEN SATURATION: 96 % | DIASTOLIC BLOOD PRESSURE: 81 MMHG | RESPIRATION RATE: 16 BRPM | BODY MASS INDEX: 29.05 KG/M2 | TEMPERATURE: 96.9 F | WEIGHT: 233.6 LBS | HEIGHT: 75 IN

## 2017-03-08 DIAGNOSIS — I49.3 PVC'S (PREMATURE VENTRICULAR CONTRACTIONS): ICD-10-CM

## 2017-03-08 LAB
ANION GAP SERPL CALCULATED.3IONS-SCNC: 7 MMOL/L (ref 3–14)
BUN SERPL-MCNC: 16 MG/DL (ref 7–30)
CALCIUM SERPL-MCNC: 8.9 MG/DL (ref 8.5–10.1)
CHLORIDE SERPL-SCNC: 107 MMOL/L (ref 94–109)
CO2 SERPL-SCNC: 28 MMOL/L (ref 20–32)
CREAT SERPL-MCNC: 0.86 MG/DL (ref 0.66–1.25)
ERYTHROCYTE [DISTWIDTH] IN BLOOD BY AUTOMATED COUNT: 13.6 % (ref 10–15)
GFR SERPL CREATININE-BSD FRML MDRD: NORMAL ML/MIN/1.7M2
GLUCOSE SERPL-MCNC: 89 MG/DL (ref 70–99)
HCT VFR BLD AUTO: 44.1 % (ref 40–53)
HGB BLD-MCNC: 15.1 G/DL (ref 13.3–17.7)
MCH RBC QN AUTO: 28 PG (ref 26.5–33)
MCHC RBC AUTO-ENTMCNC: 34.2 G/DL (ref 31.5–36.5)
MCV RBC AUTO: 82 FL (ref 78–100)
PLATELET # BLD AUTO: 163 10E9/L (ref 150–450)
POTASSIUM SERPL-SCNC: 3.9 MMOL/L (ref 3.4–5.3)
RBC # BLD AUTO: 5.39 10E12/L (ref 4.4–5.9)
SODIUM SERPL-SCNC: 142 MMOL/L (ref 133–144)
WBC # BLD AUTO: 6 10E9/L (ref 4–11)

## 2017-03-08 PROCEDURE — 40000065 ZZH STATISTIC EKG NON-CHARGEABLE

## 2017-03-08 PROCEDURE — 27210795 ZZH PAD DEFIB QUICK CR4

## 2017-03-08 PROCEDURE — 025K3ZZ DESTRUCTION OF RIGHT VENTRICLE, PERCUTANEOUS APPROACH: ICD-10-PCS | Performed by: INTERNAL MEDICINE

## 2017-03-08 PROCEDURE — 93654 COMPRE EP EVAL TX VT: CPT

## 2017-03-08 PROCEDURE — 4A023FZ MEASUREMENT OF CARDIAC RHYTHM, PERCUTANEOUS APPROACH: ICD-10-PCS | Performed by: INTERNAL MEDICINE

## 2017-03-08 PROCEDURE — 99152 MOD SED SAME PHYS/QHP 5/>YRS: CPT | Performed by: INTERNAL MEDICINE

## 2017-03-08 PROCEDURE — C1732 CATH, EP, DIAG/ABL, 3D/VECT: HCPCS

## 2017-03-08 PROCEDURE — 25000125 ZZHC RX 250: Performed by: INTERNAL MEDICINE

## 2017-03-08 PROCEDURE — 99152 MOD SED SAME PHYS/QHP 5/>YRS: CPT

## 2017-03-08 PROCEDURE — 93010 ELECTROCARDIOGRAM REPORT: CPT | Mod: 76 | Performed by: INTERNAL MEDICINE

## 2017-03-08 PROCEDURE — 25000134 H RX MED IP 250 OP 636 PS 250: Performed by: INTERNAL MEDICINE

## 2017-03-08 PROCEDURE — 3E033KZ INTRODUCTION OF OTHER DIAGNOSTIC SUBSTANCE INTO PERIPHERAL VEIN, PERCUTANEOUS APPROACH: ICD-10-PCS | Performed by: INTERNAL MEDICINE

## 2017-03-08 PROCEDURE — 27210782 ZZH KIT EP TOTES DISP CR7

## 2017-03-08 PROCEDURE — 93623 PRGRMD STIMJ&PACG IV RX NFS: CPT | Mod: 26 | Performed by: INTERNAL MEDICINE

## 2017-03-08 PROCEDURE — 27210796 ZZH PAD EXTRNAL REFRENCE CARDIAC MAPPING CR14

## 2017-03-08 PROCEDURE — 02K83ZZ MAP CONDUCTION MECHANISM, PERCUTANEOUS APPROACH: ICD-10-PCS | Performed by: INTERNAL MEDICINE

## 2017-03-08 PROCEDURE — 99153 MOD SED SAME PHYS/QHP EA: CPT | Performed by: INTERNAL MEDICINE

## 2017-03-08 PROCEDURE — 40000235 ZZH STATISTIC TELEMETRY

## 2017-03-08 PROCEDURE — 25000128 H RX IP 250 OP 636: Performed by: INTERNAL MEDICINE

## 2017-03-08 PROCEDURE — 40000852 ZZH STATISTIC HEART CATH LAB OR EP LAB

## 2017-03-08 PROCEDURE — 93623 PRGRMD STIMJ&PACG IV RX NFS: CPT

## 2017-03-08 PROCEDURE — 99153 MOD SED SAME PHYS/QHP EA: CPT

## 2017-03-08 PROCEDURE — 85027 COMPLETE CBC AUTOMATED: CPT | Performed by: INTERNAL MEDICINE

## 2017-03-08 PROCEDURE — 27210995 ZZH RX 272: Performed by: INTERNAL MEDICINE

## 2017-03-08 PROCEDURE — 93005 ELECTROCARDIOGRAM TRACING: CPT

## 2017-03-08 PROCEDURE — 80048 BASIC METABOLIC PNL TOTAL CA: CPT | Performed by: INTERNAL MEDICINE

## 2017-03-08 PROCEDURE — 93654 COMPRE EP EVAL TX VT: CPT | Performed by: INTERNAL MEDICINE

## 2017-03-08 RX ORDER — SODIUM CHLORIDE 450 MG/100ML
INJECTION, SOLUTION INTRAVENOUS CONTINUOUS
Status: DISCONTINUED | OUTPATIENT
Start: 2017-03-08 | End: 2017-03-08 | Stop reason: HOSPADM

## 2017-03-08 RX ORDER — DIPHENHYDRAMINE HYDROCHLORIDE 50 MG/ML
25-50 INJECTION INTRAMUSCULAR; INTRAVENOUS
Status: DISCONTINUED | OUTPATIENT
Start: 2017-03-08 | End: 2017-03-08 | Stop reason: HOSPADM

## 2017-03-08 RX ORDER — PROTAMINE SULFATE 10 MG/ML
1-5 INJECTION, SOLUTION INTRAVENOUS
Status: DISCONTINUED | OUTPATIENT
Start: 2017-03-08 | End: 2017-03-08 | Stop reason: HOSPADM

## 2017-03-08 RX ORDER — PROTAMINE SULFATE 10 MG/ML
5-40 INJECTION, SOLUTION INTRAVENOUS EVERY 10 MIN PRN
Status: DISCONTINUED | OUTPATIENT
Start: 2017-03-08 | End: 2017-03-08 | Stop reason: HOSPADM

## 2017-03-08 RX ORDER — LIDOCAINE 40 MG/G
CREAM TOPICAL
Status: DISCONTINUED | OUTPATIENT
Start: 2017-03-08 | End: 2017-03-08 | Stop reason: HOSPADM

## 2017-03-08 RX ORDER — LIDOCAINE HYDROCHLORIDE 10 MG/ML
10-30 INJECTION, SOLUTION EPIDURAL; INFILTRATION; INTRACAUDAL; PERINEURAL
Status: DISCONTINUED | OUTPATIENT
Start: 2017-03-08 | End: 2017-03-08 | Stop reason: HOSPADM

## 2017-03-08 RX ORDER — FENTANYL CITRATE 50 UG/ML
25-50 INJECTION, SOLUTION INTRAMUSCULAR; INTRAVENOUS
Status: DISCONTINUED | OUTPATIENT
Start: 2017-03-08 | End: 2017-03-08 | Stop reason: HOSPADM

## 2017-03-08 RX ORDER — IBUTILIDE FUMARATE 1 MG/10ML
1 INJECTION, SOLUTION INTRAVENOUS
Status: DISCONTINUED | OUTPATIENT
Start: 2017-03-08 | End: 2017-03-08 | Stop reason: HOSPADM

## 2017-03-08 RX ORDER — IBUTILIDE FUMARATE 1 MG/10ML
0.01 INJECTION, SOLUTION INTRAVENOUS
Status: DISCONTINUED | OUTPATIENT
Start: 2017-03-08 | End: 2017-03-08 | Stop reason: HOSPADM

## 2017-03-08 RX ORDER — PROMETHAZINE HYDROCHLORIDE 25 MG/ML
6.25-25 INJECTION, SOLUTION INTRAMUSCULAR; INTRAVENOUS EVERY 4 HOURS PRN
Status: DISCONTINUED | OUTPATIENT
Start: 2017-03-08 | End: 2017-03-08 | Stop reason: HOSPADM

## 2017-03-08 RX ORDER — LIDOCAINE HYDROCHLORIDE AND EPINEPHRINE 10; 10 MG/ML; UG/ML
10-30 INJECTION, SOLUTION INFILTRATION; PERINEURAL
Status: DISCONTINUED | OUTPATIENT
Start: 2017-03-08 | End: 2017-03-08 | Stop reason: HOSPADM

## 2017-03-08 RX ORDER — FUROSEMIDE 10 MG/ML
20-100 INJECTION INTRAMUSCULAR; INTRAVENOUS
Status: DISCONTINUED | OUTPATIENT
Start: 2017-03-08 | End: 2017-03-08 | Stop reason: HOSPADM

## 2017-03-08 RX ORDER — LIDOCAINE HYDROCHLORIDE 10 MG/ML
10-30 INJECTION, SOLUTION EPIDURAL; INFILTRATION; INTRACAUDAL; PERINEURAL
Status: COMPLETED | OUTPATIENT
Start: 2017-03-08 | End: 2017-03-08

## 2017-03-08 RX ORDER — NALOXONE HYDROCHLORIDE 0.4 MG/ML
.1-.4 INJECTION, SOLUTION INTRAMUSCULAR; INTRAVENOUS; SUBCUTANEOUS
Status: DISCONTINUED | OUTPATIENT
Start: 2017-03-08 | End: 2017-03-08 | Stop reason: HOSPADM

## 2017-03-08 RX ORDER — BUPIVACAINE HYDROCHLORIDE 2.5 MG/ML
10-30 INJECTION, SOLUTION EPIDURAL; INFILTRATION; INTRACAUDAL
Status: DISCONTINUED | OUTPATIENT
Start: 2017-03-08 | End: 2017-03-08 | Stop reason: HOSPADM

## 2017-03-08 RX ORDER — MORPHINE SULFATE 2 MG/ML
1-2 INJECTION, SOLUTION INTRAMUSCULAR; INTRAVENOUS EVERY 5 MIN PRN
Status: DISCONTINUED | OUTPATIENT
Start: 2017-03-08 | End: 2017-03-08 | Stop reason: HOSPADM

## 2017-03-08 RX ORDER — ONDANSETRON 2 MG/ML
4 INJECTION INTRAMUSCULAR; INTRAVENOUS EVERY 4 HOURS PRN
Status: DISCONTINUED | OUTPATIENT
Start: 2017-03-08 | End: 2017-03-08 | Stop reason: HOSPADM

## 2017-03-08 RX ORDER — HEPARIN SODIUM 1000 [USP'U]/ML
1000-10000 INJECTION, SOLUTION INTRAVENOUS; SUBCUTANEOUS EVERY 5 MIN PRN
Status: DISCONTINUED | OUTPATIENT
Start: 2017-03-08 | End: 2017-03-08 | Stop reason: HOSPADM

## 2017-03-08 RX ORDER — ADENOSINE 3 MG/ML
6-12 INJECTION, SOLUTION INTRAVENOUS EVERY 5 MIN PRN
Status: DISCONTINUED | OUTPATIENT
Start: 2017-03-08 | End: 2017-03-08 | Stop reason: HOSPADM

## 2017-03-08 RX ORDER — KETOROLAC TROMETHAMINE 30 MG/ML
15-30 INJECTION, SOLUTION INTRAMUSCULAR; INTRAVENOUS
Status: DISCONTINUED | OUTPATIENT
Start: 2017-03-08 | End: 2017-03-08 | Stop reason: HOSPADM

## 2017-03-08 RX ORDER — LORAZEPAM 2 MG/ML
.5-2 INJECTION INTRAMUSCULAR EVERY 10 MIN PRN
Status: DISCONTINUED | OUTPATIENT
Start: 2017-03-08 | End: 2017-03-08 | Stop reason: HOSPADM

## 2017-03-08 RX ORDER — FLUMAZENIL 0.1 MG/ML
0.2 INJECTION, SOLUTION INTRAVENOUS
Status: DISCONTINUED | OUTPATIENT
Start: 2017-03-08 | End: 2017-03-08 | Stop reason: HOSPADM

## 2017-03-08 RX ORDER — NALOXONE HYDROCHLORIDE 0.4 MG/ML
0.4 INJECTION, SOLUTION INTRAMUSCULAR; INTRAVENOUS; SUBCUTANEOUS EVERY 5 MIN PRN
Status: DISCONTINUED | OUTPATIENT
Start: 2017-03-08 | End: 2017-03-08 | Stop reason: HOSPADM

## 2017-03-08 RX ORDER — DOBUTAMINE HYDROCHLORIDE 200 MG/100ML
5-40 INJECTION INTRAVENOUS CONTINUOUS PRN
Status: DISCONTINUED | OUTPATIENT
Start: 2017-03-08 | End: 2017-03-08 | Stop reason: HOSPADM

## 2017-03-08 RX ADMIN — SODIUM CHLORIDE: 4.5 INJECTION, SOLUTION INTRAVENOUS at 07:15

## 2017-03-08 RX ADMIN — FENTANYL CITRATE 50 MCG: 50 INJECTION, SOLUTION INTRAMUSCULAR; INTRAVENOUS at 09:11

## 2017-03-08 RX ADMIN — MIDAZOLAM HYDROCHLORIDE 1 MG: 1 INJECTION, SOLUTION INTRAMUSCULAR; INTRAVENOUS at 09:14

## 2017-03-08 RX ADMIN — LIDOCAINE HYDROCHLORIDE 100 MG: 10 INJECTION, SOLUTION EPIDURAL; INFILTRATION; INTRACAUDAL; PERINEURAL at 09:04

## 2017-03-08 RX ADMIN — MIDAZOLAM HYDROCHLORIDE 1 MG: 1 INJECTION, SOLUTION INTRAMUSCULAR; INTRAVENOUS at 08:56

## 2017-03-08 RX ADMIN — MIDAZOLAM HYDROCHLORIDE 0.5 MG: 1 INJECTION, SOLUTION INTRAMUSCULAR; INTRAVENOUS at 09:11

## 2017-03-08 RX ADMIN — FENTANYL CITRATE 25 MCG: 50 INJECTION, SOLUTION INTRAMUSCULAR; INTRAVENOUS at 09:22

## 2017-03-08 RX ADMIN — MIDAZOLAM HYDROCHLORIDE 0.5 MG: 1 INJECTION, SOLUTION INTRAMUSCULAR; INTRAVENOUS at 09:23

## 2017-03-08 RX ADMIN — FENTANYL CITRATE 50 MCG: 50 INJECTION, SOLUTION INTRAMUSCULAR; INTRAVENOUS at 08:57

## 2017-03-08 RX ADMIN — Medication 2 MCG/MIN: at 08:50

## 2017-03-08 RX ADMIN — FENTANYL CITRATE 25 MCG: 50 INJECTION, SOLUTION INTRAMUSCULAR; INTRAVENOUS at 09:09

## 2017-03-08 NOTE — PROGRESS NOTES
No c/o post Ablation.  SR. See flow sheet.  His dad is here. Had juice and crackers, waiting for lunch tray.

## 2017-03-08 NOTE — PROGRESS NOTES
Heart tones normal, no femoral bruit.  Here with his dad.  Ablation video not available.  Explained pre procedure and answered questions.  Resting in bed with call light in reach.

## 2017-03-08 NOTE — IP AVS SNAPSHOT
[place] [address]    [place] [address]    Phone:  [phone]                                       After Visit Summary   3/8/2017    Earnest Peterson    MRN: [id number]           After Visit Summary Signature Page     I have received my discharge instructions, and my questions have been answered. I have discussed any challenges I see with this plan with the nurse or doctor.    ..........................................................................................................................................  Patient/Patient Representative Signature      ..........................................................................................................................................  Patient Representative Print Name and Relationship to Patient    ..................................................               ................................................  Date                                            Time    ..........................................................................................................................................  Reviewed by Signature/Title    ...................................................              ..............................................  Date                                                            Time

## 2017-03-08 NOTE — PROGRESS NOTES
PVC ablation:  Single morphology PVC with intermittent couplets from the [facility]. PVC suppressed by iv isoproterenol.  Successful ablation at [facility].  No complications.  May go home around 3 PM.  No need for medications.  See an EP DAVIN in 1 month. See me only as needed.

## 2017-03-08 NOTE — PROGRESS NOTES
Continues to deny c/o.  Up to BR to void at 1410.  See flow sheet.  Care Suites Discharge Summary    Discharge Criteria:   Discharge Criteria met per MD orders: Yes.   Vital signs stable.     Pt demonstrates ability to ambulate safely: Yes.  (See discharge questionnaire for additional information)    Discharge instructions & education:   Discharge instructions reviewed with patient and dad. Patient verbalizes  understanding.    Medications:   Patient will be discharging on new medications- No. Patient verbalizes reason for use, start date, and side effects NA.    Items returned to patient:   Home and hospital acquired medications returned to patient NA   Listed belongings gathered and returned to patient: Yes    Patient discharged to home via w/c with dad.    Rosita Greenfield

## 2017-03-08 NOTE — DISCHARGE INSTRUCTIONS
SVT Ablation Discharge Instructions - Femoral     After you go home:      Have an adult stay with you until tomorrow.    You may resume your normal diet.       For 24 hours - due to the sedation you received:    Relax and take it easy.    Do NOT make any important or legal decisions.    Do NOT drive or operate machines at home or at work.    Do NOT drink alcohol.    Care of Groin Puncture Site:      For the first 24 hrs - check the puncture site every 1-2 hours while awake.    For 2 days, when you cough, sneeze, laugh or move your bowels, hold your hand over the puncture site and press firmly.    Remove the bandaid after 24 hours. If there is minor oozing, apply another bandaid and remove it after 12 hours.    It is normal to have a small bruise or pea size lump at the site.    You may shower tomorrow.  Do NOT take a bath, or use a hot tub or pool for at least 3 days. Do NOT scrub the site. Do not use lotion or powder near the puncture site.    Activity:            For 2 days:    No stooping or squatting    Do NOT do any heavy activity such as exercise, lifting, or straining.     No housework, yard work or any activity that make you sweat    Do NOT lift more than 10 pounds    Bleeding:      If you start bleeding from the site in your groin, lie down flat and press firmly on the site for 10 minutes.     Once bleeding stops, lay flat for 2 hours.    Call [facility] as soon as you can.       Call 911 right away if you have heavy bleeding or bleeding that does not stop.      Medicines:      Take your medications, including blood thinners, unless your provider tells you not to.    If you have stopped any other medicines, check with your provider about when to restart them.    If you have pain or shortness of breath, you may take Advil (ibuprofen) or Tylenol (acetaminophen).    Follow Up Appointments:      An appointment has been set up for you for follow-up care    You will receive a phone call tomorrow morning  from Diana MARY or Joan MARY.    Call the clinic if:      You have increased pain or a large or growing hard lump around the site.    The site is red, swollen, hot or tender.    Blood or fluid is draining from the site.    You have chills or a fever greater than 101 F (38 C).    Your leg feels numb, cool or changes color.    Increased pain in the chest and/or groin.    Increased shortness of breath    Chest pain not relieved by Tylenol or Advil    New pain in the back or belly that you cannot control with Tylenol.    Recurrent irregular or fast heart rate (AFlutter) lasting over 2 hours.    Any questions or concerns.    Heart rhythms:    You may have some irregular heartbeats. These feel very strong. They may make you feel that the fast heart rhythm is going to start again.  Give it time. The irregular beats should occur less often.       [facility]:    [phone] ( 8am-5pm M-F)  USMAN Ortiz or USMAN Franco    [phone] [facility] (7 days a week)

## 2017-03-09 ENCOUNTER — TELEPHONE (OUTPATIENT)
Dept: CARDIOLOGY | Facility: CLINIC | Age: 37
End: 2017-03-09

## 2017-03-09 DIAGNOSIS — I49.3 PVC'S (PREMATURE VENTRICULAR CONTRACTIONS): Primary | ICD-10-CM

## 2017-03-09 LAB
INTERPRETATION ECG - MUSE: NORMAL
INTERPRETATION ECG - MUSE: NORMAL

## 2017-03-09 NOTE — TELEPHONE ENCOUNTER
Pt feeling well post PVC ablation. No heavy lifting or pulling anything greater than 10 lbs for 3 days. Pt to call if he has questions or concerns.  PLAN:   Order placed for EP DAVIN visit in 3-4 weeks with an EKG. If pt doing well at that time then see Dr.Li ruffin.

## 2017-04-28 ENCOUNTER — OFFICE VISIT (OUTPATIENT)
Dept: CARDIOLOGY | Facility: CLINIC | Age: 37
End: 2017-04-28
Attending: NURSE PRACTITIONER
Payer: COMMERCIAL

## 2017-04-28 VITALS
SYSTOLIC BLOOD PRESSURE: 118 MMHG | DIASTOLIC BLOOD PRESSURE: 66 MMHG | BODY MASS INDEX: 28.62 KG/M2 | HEIGHT: 76 IN | HEART RATE: 72 BPM | WEIGHT: 235 LBS

## 2017-04-28 DIAGNOSIS — R00.2 PALPITATIONS: Primary | ICD-10-CM

## 2017-04-28 DIAGNOSIS — I49.3 PVC'S (PREMATURE VENTRICULAR CONTRACTIONS): ICD-10-CM

## 2017-04-28 PROCEDURE — 93000 ELECTROCARDIOGRAM COMPLETE: CPT | Performed by: NURSE PRACTITIONER

## 2017-04-28 PROCEDURE — 99213 OFFICE O/P EST LOW 20 MIN: CPT | Performed by: NURSE PRACTITIONER

## 2017-04-28 NOTE — PROGRESS NOTES
HPI and Plan: #[id number]  See dictation    Orders Placed This Encounter   Procedures     EKG 12-lead complete w/read - Clinics (performed today)       No orders of the defined types were placed in this encounter.      There are no discontinued medications.      Encounter Diagnosis   Name Primary?     PVC's (premature ventricular contractions)        CURRENT MEDICATIONS:  Current Outpatient Prescriptions   Medication Sig Dispense Refill     ibuprofen (ADVIL,MOTRIN) 800 MG tablet Take 1 tablet (800 mg) by mouth every 8 hours as needed for moderate pain (Patient taking differently: Take 200 mg by mouth every 8 hours as needed for moderate pain ) 30 tablet 0       ALLERGIES     Allergies   Allergen Reactions     Nkda [No Known Drug Allergies]        PAST MEDICAL HISTORY:  Past Medical History:   Diagnosis Date     Allergic rhinitis      PVC's (premature ventricular contractions)      Recurrent cholesteatoma of postmastoidectomy cavity        PAST SURGICAL HISTORY:  Past Surgical History:   Procedure Laterality Date     ABDOMEN SURGERY      intestial surgery as an infant     MASTOIDECTOMY         FAMILY HISTORY:  Family History   Problem Relation Age of Onset     DIABETES Father      type 2     Hypertension Father      Myocardial Infarction Father      HEART DISEASE Father      Heart Surgery Father 52     bypass     Arrhythmia Father      Obesity Father      KIDNEY DISEASE Mother      Myocardial Infarction Maternal Grandfather      Family History Negative Paternal Grandmother      Family History Negative Paternal Grandfather      Family History Negative Brother      Family History Negative Maternal Grandmother        SOCIAL HISTORY:  Social History     Social History     Marital status:      Spouse name: N/A     Number of children: N/A     Years of education: N/A     Occupational History     Sales - Software.       Social History Main Topics     Smoking status: Never Smoker     Smokeless tobacco: Never Used      "Alcohol use Yes      Comment: 1 glass wine, 2 days week      Drug use: No     Sexual activity: Yes     Partners: Female     Other Topics Concern     Parent/Sibling W/ Cabg, Mi Or Angioplasty Before 65f 55m? Yes     father     Caffeine Concern No     1 cup per week     Sleep Concern No     Stress Concern Yes     Weight Concern No     Special Diet No     Exercise Yes     runs 1-2 days week     Social History Narrative       Review of Systems:  Skin:  Negative       Eyes:  Negative      ENT:  Positive for hearing loss;nasal congestion    Respiratory:  Positive for dyspnea on exertion;cough better   Cardiovascular:    Positive for;palpitations;lightheadedness;dizziness much better - no episodes  Gastroenterology: Negative      Genitourinary:  Negative      Musculoskeletal:  Positive for back pain lower back oain  Neurologic:  Positive for numbness or tingling of feet L leg, thinks its from his back pain  Psychiatric:  Positive for anxiety anxiety with PVC's  Heme/Lymph/Imm:  Negative allergies    Endocrine:  Negative        Physical Exam:  Vitals: /66  Pulse 72  Ht 1.93 m (6' 4\")  Wt 106.6 kg (235 lb)  BMI 28.61 kg/m2    Constitutional:  cooperative;alert and oriented        Skin:  warm and dry to the touch        Head:  no masses or lesions        Eyes:  pupils equal and round        ENT:  dentition good        Neck:  carotid pulses are full and equal bilaterally;JVP normal        Chest:  clear to auscultation          Cardiac: regular rhythm;normal S1 and S2   S4 no presence of murmur            Abdomen:  abdomen soft;BS normoactive        Vascular:                                          Extremities and Back:  no edema              Neurological:  no gross motor deficits              CC  Betty Mendosa, APRN CNP  [facility]  [address]  [place] [id number]              "

## 2017-04-28 NOTE — PROGRESS NOTES
HISTORY OF PRESENT ILLNESS:  Mr. Peterson is a delightful 36-year-old gentleman here today for followup.  He is an established patient of Dr. Lopez's and was seen in consultation with Dr. Castano for symptomatic PVCs.  Earnest states that he has had symptomatic PVCs for about 3 years.  He was also having atypical chest pain on the left side and increased fatigue.  He had a cardiac MRI which showed an EF of 53% with normal LV size.  However, there was evidence of delayed enhancement that was atypical consistent with either previous myocarditis, sarcoid was in the differential as was left-sided arrhythmogenic cardiomyopathy.  The reader felt the latter was less likely.  His ESR was normal.  CRP was borderline elevated, but repeat CRP came back normal.      Due to his symptoms, it was recommended that he undergo PVC ablation.  This took place on 03/08/2017 with Dr. Castano.  He was noted to have RV outflow tract PVC that was successfully ablated.  Five ablations were necessary for complete elimination of the PVCs.  He was monitored after as well as rechallenged with isoproterenol with a rare PVC from the RV apex and another one from a different area of the RV outflow tract.      Today Mr. Peterson states that he feels great.  He is back to his baseline.  He is jogging and biking without any complaints.  His right femoral access site has healed nicely.  Blood pressure is stable.  He denies any palpitations, chest discomfort, shortness of breath or dizziness.  All other review of systems and past medical history are noted below.      ASSESSMENT AND PLAN:  Mr. Peterson is a delightful 36-year-old gentleman who is here today for followup.   1.  Symptomatic PVCs.  Status post ablation of RV outflow tract PVC.  He is doing well.  He will follow up with EP on a p.r.n. basis.     2.  Delayed enhancement noted on MRI.  As mentioned in Dr. Lopez's note, he did work the patient up, and the CRP and ESR did come back negative.  He also did ACE  enzyme assessment and chest x-ray which were negative.  Dr. Lopez would like to see him back in July for followup.  Earnest is aware and will make this appointment.      As always, thank you for including us in the care of this delightful gentleman.  Please feel free to contact us if you have questions or concerns regarding today's assessment and plan.         YORDAN GAFFNEY NP             D: 2017 07:57   T: 2017 15:42   MT: NELSON      Name:     EARNEST ARECHIGA   MRN:      [id number]        Account:      [id number]   :      1980           Service Date: 2017      Document: [id number]

## 2017-04-28 NOTE — MR AVS SNAPSHOT
"              After Visit Summary   4/28/2017    Earnest Peterson    MRN: [id number]           Patient Information     Date Of Birth          1980        Visit Information        Provider Department      4/28/2017 7:30 AM Betty Mendosa APRN CNP [facility] AT [place]        Today's Diagnoses     Palpitations    -  1    PVC's (premature ventricular contractions)           Follow-ups after your visit        Who to contact     If you have questions or need follow up information about today's clinic visit or your schedule please contact [facility] directly at [phone].  Normal or non-critical lab and imaging results will be communicated to you by MyChart, letter or phone within 4 business days after the clinic has received the results. If you do not hear from us within 7 days, please contact the clinic through [employer] or phone. If you have a critical or abnormal lab result, we will notify you by phone as soon as possible.  Submit refill requests through [employer] or call your pharmacy and they will forward the refill request to us. Please allow 3 business days for your refill to be completed.          Additional Information About Your Visit        MyChart Information     [employer] gives you secure access to your electronic health record. If you see a primary care provider, you can also send messages to your care team and make appointments. If you have questions, please call your primary care clinic.  If you do not have a primary care provider, please call [phone] and they will assist you.        Care EveryWhere ID     This is your Care EveryWhere ID. This could be used by other organizations to access your [facility] medical records  [id number]        Your Vitals Were     Pulse Height BMI (Body Mass Index)             72 1.93 m (6' 4\") 28.61 kg/m2          Blood Pressure from Last 3 Encounters:   04/28/17 118/66   03/08/17 126/81 "   03/06/17 130/75    Weight from Last 3 Encounters:   04/28/17 106.6 kg (235 lb)   03/08/17 106 kg (233 lb 9.6 oz)   03/06/17 107.5 kg (237 lb)              We Performed the Following     EKG 12-lead complete w/read - Clinics (performed today)     Follow-Up with Cardiac Advanced Practice Provider          Today's Medication Changes          These changes are accurate as of: 4/28/17  8:00 AM.  If you have any questions, ask your nurse or doctor.               These medicines have changed or have updated prescriptions.        Dose/Directions    ibuprofen 800 MG tablet   Commonly known as:  ADVIL/MOTRIN   This may have changed:  how much to take   Used for:  Upper back pain on left side        Dose:  800 mg   Take 1 tablet (800 mg) by mouth every 8 hours as needed for moderate pain   Quantity:  30 tablet   Refills:  0                Primary Care Provider Office Phone # Fax #    BRINDA Olson [facility] [phone]       [facility] [address] [place] [address]        Thank you!     Thank you for choosing [facility] [place]  for your care. Our goal is always to provide you with excellent care. Hearing back from our patients is one way we can continue to improve our services. Please take a few minutes to complete the written survey that you may receive in the mail after your visit with us. Thank you!             Your Updated Medication List - Protect others around you: Learn how to safely use, store and throw away your medicines at www.disposemymeds.org.          This list is accurate as of: 4/28/17  8:00 AM.  Always use your most recent med list.                   Brand Name Dispense Instructions for use    ibuprofen 800 MG tablet    ADVIL/MOTRIN    30 tablet    Take 1 tablet (800 mg) by mouth every 8 hours as needed for moderate pain

## 2017-04-28 NOTE — LETTER
4/28/2017    BIRNDA Patton CNP  [facility]   [address]  [facility] [address]    RE: Earnest Greggrikki       Dear Colleague,    I had the pleasure of seeing Earnest Peterson in the [facility].    Mr. Peterson is a delightful 36-year-old gentleman here today for followup.  He is an established patient of Dr. White and was seen in consultation with Dr. Castano for symptomatic PVCs.  Earnest states that he has had symptomatic PVCs for about 3 years.  He was also having atypical chest pain on the left side and increased fatigue.  He had a cardiac MRI which showed an EF of 53% with normal LV size.  However, there was evidence of delayed enhancement that was atypical consistent with either previous myocarditis, sarcoid was in the differential as was left-sided arrhythmogenic cardiomyopathy.  The reader felt the latter was less likely.  His ESR was normal.  CRP was borderline elevated, but repeat CRP came back normal.      Due to his symptoms, it was recommended that he undergo PVC ablation.  This took place on 03/08/2017 with Dr. Castano.  He was noted to have RV outflow tract PVC that was successfully ablated.  Five ablations were necessary for complete elimination of the PVCs.  He was monitored after as well as rechallenged with isoproterenol with a rare PVC from the RV apex and another one from a different area of the RV outflow tract.      Today Mr. Peterson states that he feels great.  He is back to his baseline.  He is jogging and biking without any complaints.  His right femoral access site has healed nicely.  Blood pressure is stable.  He denies any palpitations, chest discomfort, shortness of breath or dizziness.  All other review of systems and past medical history are noted below.      Outpatient Encounter Prescriptions as of 4/28/2017   Medication Sig Dispense Refill     ibuprofen (ADVIL,MOTRIN) 800 MG tablet Take 1 tablet (800 mg) by mouth every 8 hours as needed  for moderate pain (Patient taking differently: Take 200 mg by mouth every 8 hours as needed for moderate pain ) 30 tablet 0     No facility-administered encounter medications on file as of 4/28/2017.      ASSESSMENT AND PLAN:  Mr. Peterson is a delightful 36-year-old gentleman who is here today for followup.   1.  Symptomatic PVCs.  Status post ablation of RV outflow tract PVC.  He is doing well.  He will follow up with EP on a p.r.n. basis.     2.  Delayed enhancement noted on MRI.  As mentioned in Dr. Lopez's note, he did work the patient up, and the CRP and ESR did come back negative.  He also did ACE enzyme assessment and chest x-ray which were negative.  Dr. Lopez would like to see him back in July for followup.  Earnest is aware and will make this appointment.      As always, thank you for including us in the care of this delightful gentleman.  Please feel free to contact us if you have questions or concerns regarding today's assessment and plan.     Sincerely,    Betty Mendosa NP, APRN CNP     [facility]

## 2017-09-05 ENCOUNTER — PRE VISIT (OUTPATIENT)
Dept: OTOLARYNGOLOGY | Facility: CLINIC | Age: 37
End: 2017-09-05

## 2017-09-05 NOTE — TELEPHONE ENCOUNTER
1.  Date/reason for appt: 9/19/17 - possible deviated septum    2.  Referring provider: self    3.  Call to patient (Yes / No - short description): no, per , pt has no pertinent outside records    4.  Previous care at: None pertinent per pt

## 2017-09-19 ENCOUNTER — OFFICE VISIT (OUTPATIENT)
Dept: OTOLARYNGOLOGY | Facility: CLINIC | Age: 37
End: 2017-09-19

## 2017-09-19 VITALS — HEIGHT: 76 IN | BODY MASS INDEX: 29.35 KG/M2 | WEIGHT: 241 LBS

## 2017-09-19 DIAGNOSIS — R09.81 NASAL CONGESTION: Primary | ICD-10-CM

## 2017-09-19 RX ORDER — FLUTICASONE PROPIONATE 50 MCG
2 SPRAY, SUSPENSION (ML) NASAL DAILY
Qty: 1 BOTTLE | Refills: 1 | Status: SHIPPED | OUTPATIENT
Start: 2017-09-19 | End: 2017-10-19

## 2017-09-19 ASSESSMENT — PAIN SCALES - GENERAL: PAINLEVEL: NO PAIN (0)

## 2017-09-19 NOTE — NURSING NOTE
Chief Complaint   Patient presents with     Consult     New problems with breathing through nose. Possible deviated septum. Self referred no outside records. Pt states no pain today.        PALMIRA Miller LPN

## 2017-09-19 NOTE — PATIENT INSTRUCTIONS
1.  You were seen in the ENT Clinic today by Dr. Hampton.  If you have any questions or concerns after your appointment, please call [phone].  Press option #1 for scheduling related needs.  Press option #3 for Nurse advice.  2.  Plan is to return to clinic in 6 weeks for another assessment.  We will book you in a procedure slot in the event you would like to move forward with the Turbinate Reduction.  3. Use Flonase as directed, this was sent to your local pharmacy.

## 2017-09-19 NOTE — LETTER
2017       RE: Earnest Peterson  [address]  [facility] [address]     Dear Colleague,    Thank you for referring your patient, Earnest Peterson, to the [facility] NO[facility] at [facility]. Please see a copy of my visit note below.    Otolaryngology Adult Consultation    Patient: Earnest Peterson  : 1980      HPI:  Earnest Peterson is a 37 year old male seen today in the Otolaryngology Clinic for difficulty breathing through his nose.  The patient reports that he has had difficulty breathing through his nose almost all his life.  The left side is more restricted to him than the right side.  There are times the right side gets more congested, but it is almost always the left side.  There is nothing that seems to make it better or worse.  He denies any history of nasal trauma.  He denies any allergy symptoms.  He denies any facial pain or pressure or sinusitis symptoms.  He has not been on any nasal sprays before.       Medications:  Current Outpatient Rx   Medication Sig Dispense Refill     ibuprofen (ADVIL,MOTRIN) 800 MG tablet Take 1 tablet (800 mg) by mouth every 8 hours as needed for moderate pain (Patient not taking: Reported on 2017) 30 tablet 0       Allergies: Nkda [no known drug allergies]     PMH:  Past Medical History:   Diagnosis Date     Allergic rhinitis      Heart rate problem 10/1/2015     PVC's (premature ventricular contractions)      Recurrent cholesteatoma of postmastoidectomy cavity        PSH:  Past Surgical History:   Procedure Laterality Date     ABDOMEN SURGERY      intestial surgery as an infant     GI SURGERY  1978     MASTOIDECTOMY       TYMPANOPLASTY  3/1/1996       FH:  Family History   Problem Relation Age of Onset     DIABETES Father      type 2     Hypertension Father      Myocardial Infarction Father      HEART DISEASE Father      Heart Surgery Father 52     bypass     Arrhythmia Father      Obesity Father       KIDNEY DISEASE Mother      Myocardial Infarction Maternal Grandfather      Family History Negative Paternal Grandmother      Family History Negative Paternal Grandfather      Family History Negative Brother      Family History Negative Maternal Grandmother         SH:  Social History   Substance Use Topics     Smoking status: Never Smoker     Smokeless tobacco: Never Used     Alcohol use Yes      Comment: 1 glass wine, 2 days week        Review of Systems   ENT ROS 2/15/2016   Ears, Nose, Throat Ringing/noise in ears, Nasal congestion or drainage       Physical Exam:    GEN:  The patient is alert, oriented and in no acute distress.  HEAD:  Head, face scalp is grossly normal.  NOSE:  External nose is straight, skin is normal.                Examination of the nose intranasally shows that the septum does have a large spur to the left side that spans the floor.  He does have turbinate hypertrophy.  A Armin maneuver did not improve nasal breathing on either side.  I did a stenting of the internal nasal valve with a Q-tip, and this is a maneuver that improved his nasal breathing the most.  We also did an Afrin challenge, and he felt that this also improved his nasal breathing but not as much as the Q-tip test.      ASSESSMENT AND PLAN:  The patient presents with nasally restricted breathing secondary to structural issues with his nose.  He does have a septal spur as well as inferior turbinate hypertrophy and a narrow left-sided nasal valve.  I discussed with the patient options for management which include observation versus nasal steroid sprays versus procedures.  I certainly would recommend the patient try a nasal steroid spray such as Flonase first.  If this fails to improve his nasal breathing, he could also consider turbinate reduction procedure versus a septoplasty and turbinate reductions versus   septorhinoplasty and turbinate reductions.  We discussed risks and benefits of each of these options.  The patient  is leaning toward turbinate reductions in clinic.  He does want to try the Flonase first.  Therefore, we will place him on a six week trial.  He will come back and see me in six weeks.  If he does not have much improvement in his nasal breathing, then we will proceed with turbinate reduction procedure on the same day.     I spent a total of 35 minutes face-to-face with Earnest Peterson during today's office visit.  Over 50% of this time was spent counseling the patient on and/or coordinating care as documented in my assessment and plan.        Again, thank you for allowing me to participate in the care of your patient.      Sincerely,    Chitra Hampton MD

## 2017-09-19 NOTE — MR AVS SNAPSHOT
After Visit Summary   9/19/2017    Earnest Peterson    MRN: [id number]           Patient Information     Date Of Birth          1980        Visit Information        Provider Department      9/19/2017 2:00 PM Chitra Hampton MD [facility]        Today's Diagnoses     Nasal congestion    -  1      Care Instructions    1.  You were seen in the ENT Clinic today by Dr. Hampton.  If you have any questions or concerns after your appointment, please call [phone].  Press option #1 for scheduling related needs.  Press option #3 for Nurse advice.  2.  Plan is to return to clinic in 6 weeks for another assessment.  We will book you in a procedure slot in the event you would like to move forward with the Turbinate Reduction.  3. Use Flonase as directed, this was sent to your local pharmacy.          Follow-ups after your visit        Your next 10 appointments already scheduled     Oct 09, 2017  9:45 AM CDT   Return Visit with Jose Cruz Lopez MD   [facility] ([facility])    [address] [place] [id number] [phone]            Oct 17, 2017  8:00 AM CDT   (Arrive by 7:45 AM)   Return Visit with Juli Petersen MD   [facility])    [address] [place] [id number] [phone]            Nov 02, 2017  8:45 AM CDT   (Arrive by 8:30 AM)   Return Visit with Chitra Hampton MD   [facility])    [address] [place] [id number] [phone]              Who to contact     Please call your clinic at [phone] to:    Ask questions about your health    Make or cancel appointments    Discuss your medicines    Learn about your test results    Speak to your doctor   If you have compliments or concerns about an experience at your  "clinic, or if you wish to file a complaint, please contact [facility] Patient Relations at [phone] or email us at [email or web address]         Additional Information About Your Visit        Minuttahart Information     [employer] gives you secure access to your electronic health record. If you see a primary care provider, you can also send messages to your care team and make appointments. If you have questions, please call your primary care clinic.  If you do not have a primary care provider, please call [phone] and they will assist you.      [employer] Imagine is an electronic gateway that provides easy, online access to your medical records. With [employer] Imagine, you can request a clinic appointment, read your test results, renew a prescription or communicate with your care team.     To access your existing account, please contact your [facility] Clinic or call [phone] for assistance.        Care EveryWhere ID     This is your Care EveryWhere ID. This could be used by other organizations to access your [place] medical records  [id number]        Your Vitals Were     Height BMI (Body Mass Index)                1.93 m (6' 4\") 29.34 kg/m2           Blood Pressure from Last 3 Encounters:   04/28/17 118/66   03/08/17 126/81   03/06/17 130/75    Weight from Last 3 Encounters:   09/19/17 109.3 kg (241 lb)   04/28/17 106.6 kg (235 lb)   03/08/17 106 kg (233 lb 9.6 oz)              Today, you had the following     No orders found for display         Today's Medication Changes          These changes are accurate as of: 9/19/17  2:44 PM.  If you have any questions, ask your nurse or doctor.               Start taking these medicines.        Dose/Directions    fluticasone 50 MCG/ACT spray   Commonly known as:  FLONASE   Used for:  Nasal congestion   Started by:  Chitra Hampton MD        Dose:  2 spray   Spray 2 sprays into both nostrils daily   Quantity:  1 Bottle "   Refills:  1            Where to get your medicines      These medications were sent to [facility] [address] [facility] [employer] - [facility] [address]  [address], [facility] [address]     Phone:  [phone]     fluticasone 50 MCG/ACT spray                Primary Care Provider Office Phone # Fax #    BRINDA Olson [employer] [phone]       [facility] [address]  [facility] [address]        Equal Access to Services     MORGAN MATHEWS : Hadii aad ku hadasho Soomaali, waaxda luqadaha, qaybta kaalmada adeegyada, waxay idiin hayaan adeeg kharash michael . So [facility] [phone].    ATENCIÓN: Si habla español, tiene a henry disposición servicios gratuitos de asistencia lingüística. Ll[facility] [phone].    We comply with applicable federal civil rights laws and [place] laws. We do not discriminate on the basis of race, color, national origin, age, disability sex, sexual orientation or gender identity.            Thank you!     Thank you for choosing [facility]  for your care. Our goal is always to provide you with excellent care. Hearing back from our patients is one way we can continue to improve our services. Please take a few minutes to complete the written survey that you may receive in the mail after your visit with us. Thank you!             Your Updated Medication List - Protect others around you: Learn how to safely use, store and throw away your medicines at www.disposemymeds.org.          This list is accurate as of: 9/19/17  2:44 PM.  Always use your most recent med list.                   Brand Name Dispense Instructions for use Diagnosis    fluticasone 50 MCG/ACT spray    FLONASE    1 Bottle    Spray 2 sprays into both nostrils daily    Nasal congestion       ibuprofen 800 MG tablet    ADVIL/MOTRIN    30 tablet    Take 1 tablet (800 mg) by mouth every 8 hours as needed for moderate pain    Upper back pain on left side

## 2017-09-19 NOTE — PROGRESS NOTES
Otolaryngology Adult Consultation    Patient: Earnest Peterson  : 1980      HPI:  Earnest Peterson is a 37 year old male seen today in the Otolaryngology Clinic for difficulty breathing through his nose.  The patient reports that he has had difficulty breathing through his nose almost all his life.  The left side is more restricted to him than the right side.  There are times the right side gets more congested, but it is almost always the left side.  There is nothing that seems to make it better or worse.  He denies any history of nasal trauma.  He denies any allergy symptoms.  He denies any facial pain or pressure or sinusitis symptoms.  He has not been on any nasal sprays before.       Medications:  Current Outpatient Rx   Medication Sig Dispense Refill     ibuprofen (ADVIL,MOTRIN) 800 MG tablet Take 1 tablet (800 mg) by mouth every 8 hours as needed for moderate pain (Patient not taking: Reported on 2017) 30 tablet 0       Allergies: Nkda [no known drug allergies]     PMH:  Past Medical History:   Diagnosis Date     Allergic rhinitis      Heart rate problem 10/1/2015     PVC's (premature ventricular contractions)      Recurrent cholesteatoma of postmastoidectomy cavity        PSH:  Past Surgical History:   Procedure Laterality Date     ABDOMEN SURGERY      intestial surgery as an infant     GI SURGERY  1978     MASTOIDECTOMY       TYMPANOPLASTY  3/1/1996       FH:  Family History   Problem Relation Age of Onset     DIABETES Father      type 2     Hypertension Father      Myocardial Infarction Father      HEART DISEASE Father      Heart Surgery Father 52     bypass     Arrhythmia Father      Obesity Father      KIDNEY DISEASE Mother      Myocardial Infarction Maternal Grandfather      Family History Negative Paternal Grandmother      Family History Negative Paternal Grandfather      Family History Negative Brother      Family History Negative Maternal Grandmother         SH:  Social History    Substance Use Topics     Smoking status: Never Smoker     Smokeless tobacco: Never Used     Alcohol use Yes      Comment: 1 glass wine, 2 days week        Review of Systems   ENT ROS 2/15/2016   Ears, Nose, Throat Ringing/noise in ears, Nasal congestion or drainage       Physical Exam:    GEN:  The patient is alert, oriented and in no acute distress.  HEAD:  Head, face scalp is grossly normal.  NOSE:  External nose is straight, skin is normal.                Examination of the nose intranasally shows that the septum does have a large spur to the left side that spans the floor.  He does have turbinate hypertrophy.  A Ouray maneuver did not improve nasal breathing on either side.  I did a stenting of the internal nasal valve with a Q-tip, and this is a maneuver that improved his nasal breathing the most.  We also did an Afrin challenge, and he felt that this also improved his nasal breathing but not as much as the Q-tip test.      ASSESSMENT AND PLAN:  The patient presents with nasally restricted breathing secondary to structural issues with his nose.  He does have a septal spur as well as inferior turbinate hypertrophy and a narrow left-sided nasal valve.  I discussed with the patient options for management which include observation versus nasal steroid sprays versus procedures.  I certainly would recommend the patient try a nasal steroid spray such as Flonase first.  If this fails to improve his nasal breathing, he could also consider turbinate reduction procedure versus a septoplasty and turbinate reductions versus   septorhinoplasty and turbinate reductions.  We discussed risks and benefits of each of these options.  The patient is leaning toward turbinate reductions in clinic.  He does want to try the Flonase first.  Therefore, we will place him on a six week trial.  He will come back and see me in six weeks.  If he does not have much improvement in his nasal breathing, then we will proceed with turbinate  reduction procedure on the same day.     I spent a total of 35 minutes face-to-face with Earnest Peterson during today's office visit.  Over 50% of this time was spent counseling the patient on and/or coordinating care as documented in my assessment and plan.

## 2017-10-09 ENCOUNTER — OFFICE VISIT (OUTPATIENT)
Dept: CARDIOLOGY | Facility: CLINIC | Age: 37
End: 2017-10-09
Attending: INTERNAL MEDICINE
Payer: COMMERCIAL

## 2017-10-09 VITALS
DIASTOLIC BLOOD PRESSURE: 78 MMHG | SYSTOLIC BLOOD PRESSURE: 113 MMHG | HEART RATE: 71 BPM | BODY MASS INDEX: 29.51 KG/M2 | WEIGHT: 242.3 LBS | HEIGHT: 76 IN

## 2017-10-09 DIAGNOSIS — I49.3 PVC'S (PREMATURE VENTRICULAR CONTRACTIONS): Primary | ICD-10-CM

## 2017-10-09 DIAGNOSIS — I51.7 CARDIOMEGALY: ICD-10-CM

## 2017-10-09 PROCEDURE — 99214 OFFICE O/P EST MOD 30 MIN: CPT | Performed by: INTERNAL MEDICINE

## 2017-10-09 PROCEDURE — 93000 ELECTROCARDIOGRAM COMPLETE: CPT | Performed by: INTERNAL MEDICINE

## 2017-10-09 NOTE — MR AVS SNAPSHOT
After Visit Summary   10/9/2017    Earnest Peterson    MRN: [id number]           Patient Information     Date Of Birth          1980        Visit Information        Provider Department      10/9/2017 9:45 AM Jose Cruz Lopez MD [facility] [place]        Today's Diagnoses     PVC's (premature ventricular contractions)    -  1    Cardiomegaly           Follow-ups after your visit        Additional Services     Follow-Up with Cardiologist                 Your next 10 appointments already scheduled     Oct 17, 2017  8:00 AM CDT   (Arrive by 7:45 AM)   Return Visit with Juli Petersen MD   [facility] ([facility])    [address] [place] [address]  [facility] [id number] [phone]            Nov 02, 2017  8:45 AM CDT   (Arrive by 8:30 AM)   Procedure with Chitra Hampton MD,  ENT PROCEDURE ROOM   [facility])    [address] [place] [id number] [phone]              Future tests that were ordered for you today     Open Future Orders        Priority Expected Expires Ordered    Follow-Up with Cardiologist Routine 10/9/2018 10/10/2018 10/9/2017    Echocardiogram Routine 10/23/2017 10/10/2018 10/9/2017            Who to contact     If you have questions or need follow up information about today's clinic visit or your schedule please contact [facility] [place] directly at [phone].  Normal or non-critical lab and imaging results will be communicated to you by MyChart, letter or phone within 4 business days after the clinic has received the results. If you do not hear from us within 7 days, please contact the clinic through MyChart or phone. If you have a critical or abnormal lab result, we will notify you by phone as soon as possible.  Submit refill requests through [employer]art or  "call your pharmacy and they will forward the refill request to us. Please allow 3 business days for your refill to be completed.          Additional Information About Your Visit        MyChart Information     OpenSearchServerhart gives you secure access to your electronic health record. If you see a primary care provider, you can also send messages to your care team and make appointments. If you have questions, please call your primary care clinic.  If you do not have a primary care provider, please call [phone] and they will assist you.        Care EveryWhere ID     This is your Care EveryWhere ID. This could be used by other organizations to access your [place] medical records  [id number]        Your Vitals Were     Pulse Height BMI (Body Mass Index)             71 1.93 m (6' 4\") 29.49 kg/m2          Blood Pressure from Last 3 Encounters:   10/09/17 113/78   04/28/17 118/66   03/08/17 126/81    Weight from Last 3 Encounters:   10/09/17 109.9 kg (242 lb 4.8 oz)   09/19/17 109.3 kg (241 lb)   04/28/17 106.6 kg (235 lb)              We Performed the Following     EKG 12-lead complete w/read - Clinics     Follow-Up with Cardiologist        Primary Care Provider Office Phone # Fax #    BRINDA Olson [facility] [phone]       [facility] [address] [facility] [address]  [facility] [address]        Equal Access to Services     MORGAN MATHEWS : Hadii aad ku hadasho Soomaali, waaxda luqadaha, qaybta kaalmada adeegyada, nerissa rodriguez . So [facility] [phone].    ATENCIÓN: Si habla español, tiene a henry disposición servicios gratuitos de asistencia lingüística. Llame al [phone].    We comply with applicable federal civil rights laws and [place] laws. We do not discriminate on the basis of race, color, national origin, age, disability, sex, sexual orientation, or gender identity.            Thank you!     Thank you for choosing [facility] [place]  for your " care. Our goal is always to provide you with excellent care. Hearing back from our patients is one way we can continue to improve our services. Please take a few minutes to complete the written survey that you may receive in the mail after your visit with us. Thank you!             Your Updated Medication List - Protect others around you: Learn how to safely use, store and throw away your medicines at www.disposemymeds.org.          This list is accurate as of: 10/9/17 10:17 AM.  Always use your most recent med list.                   Brand Name Dispense Instructions for use Diagnosis    fluticasone 50 MCG/ACT spray    FLONASE    1 Bottle    Spray 2 sprays into both nostrils daily    Nasal congestion       ibuprofen 800 MG tablet    ADVIL/MOTRIN    30 tablet    Take 1 tablet (800 mg) by mouth every 8 hours as needed for moderate pain    Upper back pain on left side

## 2017-10-09 NOTE — LETTER
10/9/2017    BRINDA Patton CNP  [facility] [address]  [facility] [address]    RE: Earnest KHARI Peterson     Dear Colleague,    I had the pleasure of seeing Earnest Peterson in the [facility].   Mr. Peterson is a delightful 36-year-old gentleman here today for followup.      He has h/o  symptomatic PVCs for about 3 years.  He was also having atypical chest pain on the left side and increased fatigue.  He had a cardiac MRI which showed an EF of 53% with normal LV size.  However, there was evidence of delayed enhancement that was atypical consistent with either previous myocarditis, sarcoid was in the differential as was left-sided arrhythmogenic cardiomyopathy.  The reader felt the latter was less likely.  His ESR was normal.  CRP was borderline elevated, but repeat CRP came back normal.       Due to his symptoms,  he was taken for  PVC ablation.  This took place on 03/08/2017 with Dr. Castano.  He was noted to have RV outflow tract PVC that was successfully ablated.  Five ablations were necessary for complete elimination of the PVCs.  He was monitored after as well as rechallenged with isoproterenol with a rare PVC from the RV apex and another one from a different area of the RV outflow tract.       Today Mr. Peterson states that he feels great.  He is back to his baseline. Blood pressure is stable.  He denies any palpitations, chest discomfort, shortness of breath or dizziness.  All other review of systems and past medical history are noted below.     EKG today reveals sinus rhythm without any PVCs.       ASSESSMENT AND PLAN:  Mr. Peterson is a delightful 36-year-old gentleman who is here today for followup.   1.  Symptomatic PVCs.  Status post ablation of RV outflow tract PVC.  He is doing well.  He will follow up with EP on a p.r.n. basis.     2.  Delayed enhancement noted on MRI.  As mentioned in Dr. Lopez's note, he did work the patient up, and the CRP and ESR did  come back negative.  He also did ACE enzyme assessment and chest x-ray which were negative.    t.   3. Cardiomegaly     Last echo showed LV in diastole dimension of 6.5 cm which may be upper limits of normal or borderline increased for his body surface area. However now that the PVCs have been successfully ablated, I would like to repeat an echocardiogram to see if there is any improvement in the LV dimension and EF. We'll schedule the echo next week and call him with results.    Overall, is doing well. We will see him back in follow-up in a year's time.    Sincerely,    Jose Cruz Lopez MD            Orders Placed This Encounter   Procedures     EKG 12-lead complete w/read - Clinics       No orders of the defined types were placed in this encounter.      There are no discontinued medications.      Encounter Diagnosis   Name Primary?     PVC's (premature ventricular contractions)        CURRENT MEDICATIONS:  Current Outpatient Prescriptions   Medication Sig Dispense Refill     fluticasone (FLONASE) 50 MCG/ACT spray Spray 2 sprays into both nostrils daily 1 Bottle 1     ibuprofen (ADVIL,MOTRIN) 800 MG tablet Take 1 tablet (800 mg) by mouth every 8 hours as needed for moderate pain 30 tablet 0       ALLERGIES     Allergies   Allergen Reactions     Nkda [No Known Drug Allergies]        PAST MEDICAL HISTORY:  Past Medical History:   Diagnosis Date     Allergic rhinitis      Heart rate problem 10/1/2015     PVC's (premature ventricular contractions)      Recurrent cholesteatoma of postmastoidectomy cavity        PAST SURGICAL HISTORY:  Past Surgical History:   Procedure Laterality Date     ABDOMEN SURGERY      intestial surgery as an infant     GI SURGERY  1978     MASTOIDECTOMY       TYMPANOPLASTY  3/1/1996       FAMILY HISTORY:  Family History   Problem Relation Age of Onset     DIABETES Father      type 2     Hypertension Father      Myocardial Infarction Father      HEART DISEASE Father      Heart Surgery Father 52  "    bypass     Arrhythmia Father      Obesity Father      KIDNEY DISEASE Mother      Myocardial Infarction Maternal Grandfather      Family History Negative Paternal Grandmother      Family History Negative Paternal Grandfather      Family History Negative Brother      Family History Negative Maternal Grandmother        SOCIAL HISTORY:  Social History     Social History     Marital status:      Spouse name: N/A     Number of children: N/A     Years of education: N/A     Occupational History     Sales - Software.       Social History Main Topics     Smoking status: Never Smoker     Smokeless tobacco: Never Used     Alcohol use Yes      Comment: 1 glass wine, 2 days week      Drug use: No     Sexual activity: Yes     Partners: Female     Other Topics Concern     Parent/Sibling W/ Cabg, Mi Or Angioplasty Before 65f 55m? Yes     father     Caffeine Concern No     1 cup per week     Sleep Concern No     Stress Concern Yes     Weight Concern No     Special Diet No     Exercise Yes     runs 1-2 days week     Social History Narrative       Review of Systems:  Skin:  Negative       Eyes:  Negative      ENT:  Negative      Respiratory:  Negative       Cardiovascular:    Positive for;palpitations (1-2 episodes in the past 2 months)    Gastroenterology: Negative      Genitourinary:  Negative      Musculoskeletal:  Positive for back pain    Neurologic:  Negative      Psychiatric:  Negative      Heme/Lymph/Imm:  Negative      Endocrine:  Negative        Physical Exam:  Vitals: /78  Pulse 71  Ht 1.93 m (6' 4\")  Wt 109.9 kg (242 lb 4.8 oz)  BMI 29.49 kg/m2    Constitutional:  cooperative;alert and oriented        Skin:  warm and dry to the touch        Head:  no masses or lesions        Eyes:  pupils equal and round        ENT:  dentition good        Neck:  carotid pulses are full and equal bilaterally;JVP normal        Chest:  clear to auscultation          Cardiac: regular rhythm;normal S1 and S2   S4 no presence " of murmur            Abdomen:  abdomen soft;BS normoactive        Vascular:                                          Extremities and Back:  no edema              Neurological:  no gross motor deficits        Thank you for allowing me to participate in the care of your patient.    Sincerely,     Jose Cruz Lopez MD     [facility]

## 2017-10-09 NOTE — PROGRESS NOTES
HPI and Plan:   Mr. Peterson is a delightful 36-year-old gentleman here today for followup.      He has h/o  symptomatic PVCs for about 3 years.  He was also having atypical chest pain on the left side and increased fatigue.  He had a cardiac MRI which showed an EF of 53% with normal LV size.  However, there was evidence of delayed enhancement that was atypical consistent with either previous myocarditis, sarcoid was in the differential as was left-sided arrhythmogenic cardiomyopathy.  The reader felt the latter was less likely.  His ESR was normal.  CRP was borderline elevated, but repeat CRP came back normal.       Due to his symptoms,  he was taken for  PVC ablation.  This took place on 03/08/2017 with Dr. Castano.  He was noted to have RV outflow tract PVC that was successfully ablated.  Five ablations were necessary for complete elimination of the PVCs.  He was monitored after as well as rechallenged with isoproterenol with a rare PVC from the RV apex and another one from a different area of the RV outflow tract.       Today Mr. Petesron states that he feels great.  He is back to his baseline. Blood pressure is stable.  He denies any palpitations, chest discomfort, shortness of breath or dizziness.  All other review of systems and past medical history are noted below.     EKG today reveals sinus rhythm without any PVCs.       ASSESSMENT AND PLAN:  Mr. Peterson is a delightful 36-year-old gentleman who is here today for followup.   1.  Symptomatic PVCs.  Status post ablation of RV outflow tract PVC.  He is doing well.  He will follow up with EP on a p.r.n. basis.     2.  Delayed enhancement noted on MRI.  As mentioned in Dr. Lopez's note, he did work the patient up, and the CRP and ESR did come back negative.  He also did ACE enzyme assessment and chest x-ray which were negative.    t.   3. Cardiomegaly   Last echo showed LV in diastole dimension of 6.5 cm which may be upper limits of normal or borderline increased  for his body surface area. However now that the PVCs have been successfully ablated, I would like to repeat an echocardiogram to see if there is any improvement in the LV dimension and EF. We'll schedule the echo next week and call him with results.    Overall, is doing well. We will see him back in follow-up in a year's time.      Sincerely,    Jose Cruz Lopez MD              Orders Placed This Encounter   Procedures     EKG 12-lead complete w/read - Clinics       No orders of the defined types were placed in this encounter.      There are no discontinued medications.      Encounter Diagnosis   Name Primary?     PVC's (premature ventricular contractions)        CURRENT MEDICATIONS:  Current Outpatient Prescriptions   Medication Sig Dispense Refill     fluticasone (FLONASE) 50 MCG/ACT spray Spray 2 sprays into both nostrils daily 1 Bottle 1     ibuprofen (ADVIL,MOTRIN) 800 MG tablet Take 1 tablet (800 mg) by mouth every 8 hours as needed for moderate pain 30 tablet 0       ALLERGIES     Allergies   Allergen Reactions     Nkda [No Known Drug Allergies]        PAST MEDICAL HISTORY:  Past Medical History:   Diagnosis Date     Allergic rhinitis      Heart rate problem 10/1/2015     PVC's (premature ventricular contractions)      Recurrent cholesteatoma of postmastoidectomy cavity        PAST SURGICAL HISTORY:  Past Surgical History:   Procedure Laterality Date     ABDOMEN SURGERY      intestial surgery as an infant     GI SURGERY  1978     MASTOIDECTOMY       TYMPANOPLASTY  3/1/1996       FAMILY HISTORY:  Family History   Problem Relation Age of Onset     DIABETES Father      type 2     Hypertension Father      Myocardial Infarction Father      HEART DISEASE Father      Heart Surgery Father 52     bypass     Arrhythmia Father      Obesity Father      KIDNEY DISEASE Mother      Myocardial Infarction Maternal Grandfather      Family History Negative Paternal Grandmother      Family History Negative Paternal Grandfather   "    Family History Negative Brother      Family History Negative Maternal Grandmother        SOCIAL HISTORY:  Social History     Social History     Marital status:      Spouse name: N/A     Number of children: N/A     Years of education: N/A     Occupational History     Sales - Software.       Social History Main Topics     Smoking status: Never Smoker     Smokeless tobacco: Never Used     Alcohol use Yes      Comment: 1 glass wine, 2 days week      Drug use: No     Sexual activity: Yes     Partners: Female     Other Topics Concern     Parent/Sibling W/ Cabg, Mi Or Angioplasty Before 65f 55m? Yes     father     Caffeine Concern No     1 cup per week     Sleep Concern No     Stress Concern Yes     Weight Concern No     Special Diet No     Exercise Yes     runs 1-2 days week     Social History Narrative       Review of Systems:  Skin:  Negative       Eyes:  Negative      ENT:  Negative      Respiratory:  Negative       Cardiovascular:    Positive for;palpitations (1-2 episodes in the past 2 months)    Gastroenterology: Negative      Genitourinary:  Negative      Musculoskeletal:  Positive for back pain    Neurologic:  Negative      Psychiatric:  Negative      Heme/Lymph/Imm:  Negative      Endocrine:  Negative        Physical Exam:  Vitals: /78  Pulse 71  Ht 1.93 m (6' 4\")  Wt 109.9 kg (242 lb 4.8 oz)  BMI 29.49 kg/m2    Constitutional:  cooperative;alert and oriented        Skin:  warm and dry to the touch        Head:  no masses or lesions        Eyes:  pupils equal and round        ENT:  dentition good        Neck:  carotid pulses are full and equal bilaterally;JVP normal        Chest:  clear to auscultation          Cardiac: regular rhythm;normal S1 and S2   S4 no presence of murmur            Abdomen:  abdomen soft;BS normoactive        Vascular:                                          Extremities and Back:  no edema              Neurological:  no gross motor deficits              CAROLIN Billingsley" Adilene Lopez MD  [address]  [address]  [place] [address]

## 2017-10-17 ENCOUNTER — OFFICE VISIT (OUTPATIENT)
Dept: OTOLARYNGOLOGY | Facility: CLINIC | Age: 37
End: 2017-10-17

## 2017-10-17 VITALS — HEIGHT: 75 IN | BODY MASS INDEX: 29.47 KG/M2 | WEIGHT: 237 LBS

## 2017-10-17 DIAGNOSIS — Z86.69 HISTORY OF CHOLESTEATOMA: Primary | ICD-10-CM

## 2017-10-17 DIAGNOSIS — H90.12 CONDUCTIVE HEARING LOSS OF LEFT EAR, UNSPECIFIED HEARING STATUS ON CONTRALATERAL SIDE: ICD-10-CM

## 2017-10-17 ASSESSMENT — PAIN SCALES - GENERAL: PAINLEVEL: NO PAIN (0)

## 2017-10-17 NOTE — LETTER
10/17/2017       RE: Earnest Peterson  [address]  [place] [address]     Dear Colleague,    Thank you for referring your patient, Earnest Peterson, to the [facility] EAR NOSE AND THROAT at [facility]. Please see a copy of my visit note below.    HISTORY OF PRESENT ILLNESS:  I had the pleasure of seeing Earnest Peterson today for care of his mastoid cavity.  He is a 37-year-old man who had a left canal wall down cavity made many years ago for cholesteatoma.  He comes in periodically for cleaning.  He reports today that the left ear has been feeling more stuffy.  He is aware that there is more material in it but overall has been doing fairly well.      PHYSICAL EXAMINATION:  He appeared well.  The ears were examined underneath the otomicroscope.  A modest amount of cerumen was removed from the right.  He has an intact right tympanic membrane with an osteoma from the anterior canal wall that points towards the malleus handle but is not in contact with it.  On the left, there is a meatus and a canal wall down cavity that has a large pocket in the inferior aspect going towards the mastoid tip.  With a standing orientation, I was able to reach there with multiple instruments and perform a mastoid cavity debridement.  I used a right angle, alligator, and suction to clean this entire area and then worked my way up superiorly in the sinodural angle around to the lateral semicircular canal and the material draped up into the supratubal recess where I removed additional material there as well as from the roof of the tegmen.  There is a neomembrane inferiorly which is intact and prominent  inferior ear canal which remains in place.      IMPRESSION AND RECOMMENDATIONS:  Mr. Peterson is a 37-year-old man who presented for care of his left mastoid cavity.  He also has reported a sensation of decreased hearing in the left ear.  I cleaned the cavity tablet today.  I would like him to initiate  vinegar and water rinses.  We provided all the material for this.  He will do that every one to two weeks and return to see me in six months for cleaning as recommended.  Additionally, he would like to return to have an audiogram since he has had decreased hearing recently.  He will let us know when that has been done so we can review the results.           Again, thank you for allowing me to participate in the care of your patient.      Sincerely,    Juli Petersen MD

## 2017-10-17 NOTE — MR AVS SNAPSHOT
After Visit Summary   10/17/2017    Earnest Peterson    MRN: [id number]           Patient Information     Date Of Birth          1980        Visit Information        Provider Department      10/17/2017 8:00 AM Juli Petersen MD [facility] Ear Nose and Throat        Care Instructions     VINEGAR AND WATER IRRIGATIONS  Ear infections can sometimes be prevented or treated using vinegar and water irrigations. Vinegar lowers the pH, which makes it hard for bacteria and fungi to grow. In addition, irrigations can help clean out wax, old skin or debris. Normal ear wax is also mildly acidic, so the acetic acid irrigation both cleans out debris and replicates the usual ear environment.  HOW TO IRRIGATE:  -Start by mixing a good quality white vinegar with an equal amount of warm tap or distilled water.   -Draw up the solution in the 5 ml syringe given to you after ENT clinic visit. Lean over a sink to irrigate the ear.   -Make sure the mixture is close to body temperature or slightly warmer, as if it is too cold or too hot, it can make you very dizzy.   -Repeat 3-4 times( total volume 20-25 ml, or less than an ounce) with each irrigation session.  -Use hairdryer on low after irrigation to dry ear canal.  -Please do irrigation weekly or as instructed.  Return to clinic 6 months for exam, call with ?/concerns. ( [phone] option # 3)                   Follow-ups after your visit        Your next 10 appointments already scheduled     Oct 20, 2017  8:30 AM CDT   Ech Complete with [id number]   [facility] CV Echocardiography (Cardiovascular Imaging at [facility])    [address] [place] [address] [phone]           1. Please bring or wear a comfortable two-piece outfit. 2. You may eat, drink and take your normal medicines. 3. For any questions that cannot be answered, please contact the ordering physician            Nov 02, 2017  8:45 AM CDT   (Arrive  "by 8:30 AM)   Procedure with Chitra Hampton MD,  ENT PROCEDURE ROOM   [facility] Ear Nose and Throat ([facility] [place])    [address] [address] [phone]              Who to contact     Please call your clinic at [phone] to:    Ask questions about your health    Make or cancel appointments    Discuss your medicines    Learn about your test results    Speak to your doctor   If you have compliments or concerns about an experience at your clinic, or if you wish to file a complaint, please contact [facility] Phys[facility] Patient Relations at [phone] or email us at [email or web address]         Additional Information About Your Visit        [employer] Information     [employer] gives you secure access to your electronic health record. If you see a primary care provider, you can also send messages to your care team and make appointments. If you have questions, please call your primary care clinic.  If you do not have a primary care provider, please call [phone] and they will assist you.      [employer] is an electronic gateway that provides easy, online access to your medical records. With [employer], you can request a clinic appointment, read your test results, renew a prescription or communicate with your care team.     To access your existing account, please contact your [facility] or call [phone] for assistance.        Care EveryWhere ID     This is your Care EveryWhere ID. This could be used by other organizations to access your [place] medical records  [id number]        Your Vitals Were     Height BMI (Body Mass Index)                1.91 m (6' 3.2\") 29.47 kg/m2           Blood Pressure from Last 3 Encounters:   10/09/17 113/78   04/28/17 118/66   03/08/17 126/81    Weight from Last 3 Encounters:   10/17/17 107.5 kg (237 lb)   10/09/17 109.9 kg (242 lb 4.8 oz)   09/19/17 109.3 kg " (241 lb)              Today, you had the following     No orders found for display       Primary Care Provider Office Phone # Fax #    BRINDA Olson [facility] [phone]       [facility] [address] [facility] [address]  [facility] [address]        Equal Access to Services     [facility] REID : Hadii aad ku hadasho Soomaali, waaxda luqadaha, qaybta kaalmada adeegyada, waxay idiin hayaan adeeg kharash la'emmanuel . So [place] -[phone].    ATENCIÓN: Si habla español, tiene a henry disposición servicios gratuitos de asistencia lingüística. Montserrat al [phone].    We comply with applicable federal civil rights laws and [place] laws. We do not discriminate on the basis of race, color, national origin, age, disability, sex, sexual orientation, or gender identity.            Thank you!     Thank you for choosing [facility] EAR NOSE AND THROAT  for your care. Our goal is always to provide you with excellent care. Hearing back from our patients is one way we can continue to improve our services. Please take a few minutes to complete the written survey that you may receive in the mail after your visit with us. Thank you!             Your Updated Medication List - Protect others around you: Learn how to safely use, store and throw away your medicines at www.disposemymeds.org.          This list is accurate as of: 10/17/17  8:26 AM.  Always use your most recent med list.                   Brand Name Dispense Instructions for use Diagnosis    fluticasone 50 MCG/ACT spray    FLONASE    1 Bottle    Spray 2 sprays into both nostrils daily    Nasal congestion

## 2017-10-17 NOTE — PROGRESS NOTES
HISTORY OF PRESENT ILLNESS:  I had the pleasure of seeing Earnest Peterson today for care of his mastoid cavity.  He is a 37-year-old man who had a left canal wall down cavity made many years ago for cholesteatoma.  He comes in periodically for cleaning.  He reports today that the left ear has been feeling more stuffy.  He is aware that there is more material in it but overall has been doing fairly well.      PHYSICAL EXAMINATION:  He appeared well.  The ears were examined underneath the otomicroscope.  A modest amount of cerumen was removed from the right.  He has an intact right tympanic membrane with an osteoma from the anterior canal wall that points towards the malleus handle but is not in contact with it.  On the left, there is a meatus and a canal wall down cavity that has a large pocket in the inferior aspect going towards the mastoid tip.  With a standing orientation, I was able to reach there with multiple instruments and perform a mastoid cavity debridement.  I used a right angle, alligator, and suction to clean this entire area and then worked my way up superiorly in the sinodural angle around to the lateral semicircular canal and the material draped up into the supratubal recess where I removed additional material there as well as from the roof of the tegmen.  There is a neomembrane inferiorly which is intact and prominent  inferior ear canal which remains in place.      IMPRESSION AND RECOMMENDATIONS:  Mr. Peterson is a 37-year-old man who presented for care of his left mastoid cavity.  He also has reported a sensation of decreased hearing in the left ear.  I cleaned the cavity tablet today.  I would like him to initiate vinegar and water rinses.  We provided all the material for this.  He will do that every one to two weeks and return to see me in six months for cleaning as recommended.  Additionally, he would like to return to have an audiogram since he has had decreased hearing recently.  He will let  us know when that has been done so we can review the results.

## 2017-10-17 NOTE — NURSING NOTE
Chief Complaint   Patient presents with     RECHECK     ear cleaning . declined audio     Gil Bernardo LPN

## 2017-10-17 NOTE — PATIENT INSTRUCTIONS
VINEGAR AND WATER IRRIGATIONS  Ear infections can sometimes be prevented or treated using vinegar and water irrigations. Vinegar lowers the pH, which makes it hard for bacteria and fungi to grow. In addition, irrigations can help clean out wax, old skin or debris. Normal ear wax is also mildly acidic, so the acetic acid irrigation both cleans out debris and replicates the usual ear environment.  HOW TO IRRIGATE:  -Start by mixing a good quality white vinegar with an equal amount of warm tap or distilled water.   -Draw up the solution in the 5 ml syringe given to you after ENT clinic visit. Lean over a sink to irrigate the ear.   -Make sure the mixture is close to body temperature or slightly warmer, as if it is too cold or too hot, it can make you very dizzy.   -Repeat 3-4 times( total volume 20-25 ml, or less than an ounce) with each irrigation session.  -Use hairdryer on low after irrigation to dry ear canal.  -Please do irrigation weekly or as instructed.  Return to clinic 6 months for exam, call with ?/concerns. ( [phone] option # 3)         1. Please follow-up in clinic in 6-12 months  2. Please call the ENT clinic with any questions,concerns, new or worsening symptoms.    -Clinic number is [phone]   - Amanda's direct line (Dr. Petersen' nurse) [phone]

## 2017-10-20 ENCOUNTER — TELEPHONE (OUTPATIENT)
Dept: LAB | Facility: CLINIC | Age: 37
End: 2017-10-20

## 2017-10-20 ENCOUNTER — HOSPITAL ENCOUNTER (OUTPATIENT)
Dept: CARDIOLOGY | Facility: CLINIC | Age: 37
Discharge: HOME | End: 2017-10-20
Attending: INTERNAL MEDICINE | Admitting: INTERNAL MEDICINE
Payer: COMMERCIAL

## 2017-10-20 DIAGNOSIS — I51.7 CARDIOMEGALY: ICD-10-CM

## 2017-10-20 DIAGNOSIS — I49.3 PVC'S (PREMATURE VENTRICULAR CONTRACTIONS): ICD-10-CM

## 2017-10-20 PROCEDURE — 93306 TTE W/DOPPLER COMPLETE: CPT

## 2017-10-20 PROCEDURE — 93306 TTE W/DOPPLER COMPLETE: CPT | Mod: 26 | Performed by: INTERNAL MEDICINE

## 2017-10-20 NOTE — TELEPHONE ENCOUNTER
Pt informed echo results:   Dx:  Cardiomegaly; PVC's (premature ventri... Order: [id number]       Notes Recorded by Jose Cruz Lopez MD on 10/20/2017 at 10:17 AM  llok at my result note from earlier today. Notes Recorded by Jose Cruz Lopez MD on 10/20/2017 at 9:40 AM  Call pt w results. Ef and lv size now normal. Good improvement in lv size. Pt T Kirsten MARY       Details

## 2017-10-25 ENCOUNTER — OFFICE VISIT (OUTPATIENT)
Dept: AUDIOLOGY | Facility: CLINIC | Age: 37
End: 2017-10-25

## 2017-10-25 DIAGNOSIS — H90.12 CONDUCTIVE HEARING LOSS OF LEFT EAR WITH UNRESTRICTED HEARING OF RIGHT EAR: Primary | ICD-10-CM

## 2017-10-25 DIAGNOSIS — H93.12 TINNITUS OF LEFT EAR: ICD-10-CM

## 2017-10-25 DIAGNOSIS — R42 DIZZINESS AND GIDDINESS: ICD-10-CM

## 2017-10-25 NOTE — MR AVS SNAPSHOT
After Visit Summary   10/25/2017    Earnest Peterson    MRN: [id number]           Patient Information     Date Of Birth          1980        Visit Information        Provider Department      10/25/2017 8:30 AM Christel Barone AuD [facility] Audiology        Today's Diagnoses     Conductive hearing loss of left ear with unrestricted hearing of right ear    -  1    Tinnitus of left ear        Dizziness and giddiness           Follow-ups after your visit        Your next 10 appointments already scheduled     Nov 02, 2017  8:45 AM CDT   (Arrive by 8:30 AM)   Procedure with Chitra Hampton MD,  ENT PROCEDURE ROOM   [facility] Ear Nose and Throat ([facility] [place])    [address] [address] [phone]              Who to contact     Please call your clinic at [phone] to:    Ask questions about your health    Make or cancel appointments    Discuss your medicines    Learn about your test results    Speak to your doctor   If you have compliments or concerns about an experience at your clinic, or if you wish to file a complaint, please contact [facility]icians Patient Relations at [phone] or email us at [email or web address]         Additional Information About Your Visit        MyChart Information     [employer] gives you secure access to your electronic health record. If you see a primary care provider, you can also send messages to your care team and make appointments. If you have questions, please call your primary care clinic.  If you do not have a primary care provider, please call [phone] and they will assist you.      [employer] is an electronic gateway that provides easy, online access to your medical records. With [employer] you can request a clinic appointment, read your test results, renew a prescription or communicate with your care team.     To access your existing account, please  contact your [facility] or call [phone] for assistance.        Care EveryWhere ID     This is your Care EveryWhere ID. This could be used by other organizations to access your [place] medical records  [id number]         Blood Pressure from Last 3 Encounters:   10/09/17 113/78   04/28/17 118/66   03/08/17 126/81    Weight from Last 3 Encounters:   10/17/17 107.5 kg (237 lb)   10/09/17 109.9 kg (242 lb 4.8 oz)   09/19/17 109.3 kg (241 lb)              We Performed the Following     AUDIOGRAM/TYMPANOGRAM - INTERFACE     Cmpn Audiometry Thrshld Eval & Speech Recog (92156)     Reduced 52 - Tymps / Reflex   (17029)     Olivia   ([id number])        Primary Care Provider Office Phone # Fax #    Yarely SAUCEDO BRINDA Gonsalves [facility] [phone]       [facility] [address] [facility] [address]  [facility] [address]        Equal Access to Services     MORGAN AMTHEWS : Hadii aad ku hadasho Soomaali, waaxda luqadaha, qaybta kaalmada adeegyada, waxay idiin hayaan adeeg kharanadeem rodriguez . So [facility] [phone].    ATENCIÓN: Si habla español, tiene a henry disposición servicios gratuitos de asistencia lingüística. Llame al [phone].    We comply with applicable federal civil rights laws and [place] laws. We do not discriminate on the basis of race, color, national origin, age, disability, sex, sexual orientation, or gender identity.            Thank you!     Thank you for choosing [facility] AUDIOLOGY  for your care. Our goal is always to provide you with excellent care. Hearing back from our patients is one way we can continue to improve our services. Please take a few minutes to complete the written survey that you may receive in the mail after your visit with us. Thank you!             Your Updated Medication List - Protect others around you: Learn how to safely use, store and throw away your medicines at www.disposemymeds.org.      Notice  As of 10/25/2017  1:53 PM    You have not been prescribed  any medications.

## 2017-10-25 NOTE — PROGRESS NOTES
"AUDIOLOGY REPORT    SUBJECTIVE:  Earnest Peterson is a 37 year old male who was seen in the Audiology Clinic at [facility]  for audiologic evaluation, referred by Juli Petersen MD. The patient has been seen previously in this clinic on 2/19/2019 for assessment and results indicated normal hearing sensitivity in the right ear and normal to severe conductive hearing loss in the left ear. The patient reports a that he was recently seen by Dr. Petersen for an ear cleaning, after which he felt he was able to hear slightly better from his left ear. He reports occasional left tinnitus that he describes as a \"ping,\" this occurs 1-2x/week. He also reports occasional left drainage, and vertigo when cold air or water enters the left ear. The patient notes difficulty with communication in a variety of noisy listening situations.       OBJECTIVE:  Otoscopic exam indicates ears are clear of cerumen bilaterally     Pure Tone Thresholds assessed using conventional audiometry with good  reliability from 250-8000 Hz bilaterally using insert earphones and circumaural headphones     RIGHT:  normal hearing sensitivity     LEFT:    normal to severe conductive hearing loss    Tympanogram:    RIGHT: hypercompliant eardrum mobility    LEFT:   Could not obtain a pneumatic seal     Reflexes (reported by stimulus ear):  RIGHT: Ipsilateral is present at normal levels  RIGHT: Contralateral could not obtain a pneumatic seal   LEFT:   Ipsilateral could not obtain a pneumatic seal   LEFT:   Contralateral is present at normal levels      Speech Reception Threshold:    RIGHT: 5 dB HL    LEFT:   30 dB HL    Word Recognition Score:     RIGHT: 100% at 50 dB HL using NU-6 recorded word list.    LEFT:   92% at 75 dB HL using NU-6 recorded word list.    Olivia: negative       ASSESSMENT:   Right: normal hearing sensitivity   Left: normal to severe conductive hearing loss     Compared to patient's previous audiogram dated " 2/19/2016, hearing has remained stable except for 15 dB HL worsening in the right ear and 10 dB HL worsening at 2000 Hz in the left ear. Today s results were discussed with the patient in detail.     PLAN:  Patient was counseled regarding hearing loss and impact on communication. Patient is a good candidate for amplification in the left ear pending medical clearance. It is recommended that the patient follow up with ENT as planned.  Please call this clinic with questions regarding these results or recommendations.    CC Results: Juli Petersen MD (ENT)       Armin Raphael, F-AAA   Clinical Audiologist, [place] #[id number]   10/25/2017

## 2017-10-27 ENCOUNTER — CARE COORDINATION (OUTPATIENT)
Dept: OTOLARYNGOLOGY | Facility: CLINIC | Age: 37
End: 2017-10-27

## 2017-10-27 NOTE — PROGRESS NOTES
Called patient and left message to let him know that Dr. Petersen reviewed his audiogram and would medically clear him for a trial with amplification in his left ear. Patient was encouraged to call back to discuss further or with further questions or concerns.     Amanda Amaral RN, BSN

## 2017-11-02 ENCOUNTER — OFFICE VISIT (OUTPATIENT)
Dept: OTOLARYNGOLOGY | Facility: CLINIC | Age: 37
End: 2017-11-02

## 2017-11-02 VITALS — BODY MASS INDEX: 29.22 KG/M2 | WEIGHT: 240 LBS | HEIGHT: 76 IN

## 2017-11-02 DIAGNOSIS — R09.81 NASAL CONGESTION: Primary | ICD-10-CM

## 2017-11-02 ASSESSMENT — PAIN SCALES - GENERAL: PAINLEVEL: NO PAIN (0)

## 2017-11-02 NOTE — NURSING NOTE
Invasive Procedure Safety Checklist  Procedure:  Radiofrequency inferior turbinate reduction    Responsible person(s):  Complete sections as appropriate and electronically sign and date below.    Staff/Provider  Consent documentation on chart:  YES  H&P is not applicable (when straight local anesthesia is used).    Procedure Team  Completed by comparing informed consent documentation, information on the patient record and/or the marked surgical site, and discussion with the patient/guardian.     Verified:  (Select all that apply)  Patient identification (two indicators)  Procedure to be performed  Procedure site and /or laterality and/or level  Consent  Procedure site:  Site can not be marked due to location.  Provider Hdez - Site/Laterality/Level:  No Level or Structure  Staff/Provider:  No images    Procedure Team:  *Pause for the Cause* verbal and active participation of team members- verify:  Patient name:  YES  Procedure to be performed:  YES  Site, laterality and level, noting patient position:  YES    Above steps completed as applicable (Electronic Signature, Title, Date):    Chitra Acuña R.N., B.S.N.  Nurse Coordinator Head & Neck Surgery  [phone]  11/2/2017 9:36 AM       Note:  Any incidents of wrong patient, wrong procedure, or wrong site are reported using the Occurrence Process already in place.  The occurrence form is required to be completed immediately with this type of event.

## 2017-11-02 NOTE — MR AVS SNAPSHOT
After Visit Summary   11/2/2017    Earnest Peterson    MRN: [id number]           Patient Information     Date Of Birth          1980        Visit Information        Provider Department      11/2/2017 8:45 AM Chitra Hampton MD;  ENT PROCEDURE ROOM [facility] Ear Nose and Throat        Today's Diagnoses     Nasal congestion    -  1      Care Instructions    1.  You were seen in the ENT Clinic today by Dr. Hampton.  If you have any questions or concerns after your appointment, please call [phone].  Press option #1 for scheduling related needs.  Press option #3 for Nurse advice.  2.  Plan is to return to clinic in 4-6 weeks for another assessment.    Turbinate Reduction:  A turbinate reduction done in clinic is completed under local anesthesia and radiofrequency is used to shrink the turbinates. This process can be slightly uncomfortable but not painful. Most patients do not experience any pain and return to work that day.       POST TURBINATE REDUCTION-WHAT TO EXPECT  -Avoid hard nose blowing for 24 hours. Gentle nose blowing for the next 48 hours.  -Irrigate nasal with saline twice daily for the next 2-3 weeks.  You can purchase this over the counter.  2 sprays each side.  This can be purchased over the counter. Generic is okay.  -You may feel more congested for 1-2 weeks after the treatment and that the full effect of the treatment may not be felt until 4 weeks after procedure.  -Return to clinic in 4-6 weeks for another assessment.        Please call our clinic for any questions,concerns,or worsening symptoms.  Clinic #[phone]   Option #3 for the triage nurse.                        Follow-ups after your visit        Your next 10 appointments already scheduled     Dec 14, 2017  8:45 AM CST   (Arrive by 8:30 AM)   Return Visit with Chitra Hampton MD   [facility] Ear Nose [facility] Throat ([facility] [place])    [address] [place] [facility] [address]  [address] [facility]  "[id number] [phone]              Who to contact     Please call your clinic at [phone] to:    Ask questions about your health    Make or cancel appointments    Discuss your medicines    Learn about your test results    Speak to your doctor   If you have compliments or concerns about an experience at your clinic, or if you wish to file a complaint, please contact [facility] Patient Relations at [phone] or email us at [email or web address]         Additional Information About Your Visit        [employer] Information     [employer] gives you secure access to your electronic health record. If you see a primary care provider, you can also send messages to your care team and make appointments. If you have questions, please call your primary care clinic.  If you do not have a primary care provider, please call [phone] and they will assist you.      [employer] is an electronic gateway that provides easy, online access to your medical records. With [employer], you can request a clinic appointment, read your test results, renew a prescription or communicate with your care team.     To access your existing account, please contact your [facility] Clinic or call [phone] for assistance.        Care EveryWhere ID     This is your Care EveryWhere ID. This could be used by other organizations to access your [facility] medical records  [id number]        Your Vitals Were     Height BMI (Body Mass Index)                1.93 m (6' 4\") 29.21 kg/m2           Blood Pressure from Last 3 Encounters:   10/09/17 113/78   04/28/17 118/66   03/08/17 126/81    Weight from Last 3 Encounters:   11/02/17 108.9 kg (240 lb)   10/17/17 107.5 kg (237 lb)   10/09/17 109.9 kg (242 lb 4.8 oz)              We Performed the Following     CAUTERY/ABLATION TURBINATES, INTRAMURAL        Primary Care Provider Office Phone # Fax #    BRINDA Olson [phone]       " [facility] [address] [facility] [address]  [facility] [address]        Equal Access to Services     GILMERFAVIOLA REID : Hadii aad ku hadrufinayarely Soángela, waaxda lumariahadaha, qaybta avelinotimda pavithra, nerissa stout hayemmanuel espinalharleynadeem condon. So [facility] [phone].    ATENCIÓN: Si habla español, tiene a henry disposición servicios gratuitos de asistencia lingüística. Llame al [phone].    We comply with applicable federal civil rights laws and [place] laws. We do not discriminate on the basis of race, color, national origin, age, disability, sex, sexual orientation, or gender identity.            Thank you!     Thank you for choosing [facility] EAR NOSE AND THROAT  for your care. Our goal is always to provide you with excellent care. Hearing back from our patients is one way we can continue to improve our services. Please take a few minutes to complete the written survey that you may receive in the mail after your visit with us. Thank you!             Your Updated Medication List - Protect others around you: Learn how to safely use, store and throw away your medicines at www.disposemymeds.org.          This list is accurate as of: 11/2/17  9:46 AM.  Always use your most recent med list.                   Brand Name Dispense Instructions for use Diagnosis    fluticasone 27.5 MCG/SPRAY spray    VERAMYST     Spray 2 sprays into both nostrils daily

## 2017-11-02 NOTE — PATIENT INSTRUCTIONS
1.  You were seen in the ENT Clinic today by Dr. Hampton.  If you have any questions or concerns after your appointment, please call [phone].  Press option #1 for scheduling related needs.  Press option #3 for Nurse advice.  2.  Plan is to return to clinic in 4-6 weeks for another assessment.    Turbinate Reduction:  A turbinate reduction done in clinic is completed under local anesthesia and radiofrequency is used to shrink the turbinates. This process can be slightly uncomfortable but not painful. Most patients do not experience any pain and return to work that day.       POST TURBINATE REDUCTION-WHAT TO EXPECT  -Avoid hard nose blowing for 24 hours. Gentle nose blowing for the next 48 hours.  -Irrigate nasal with saline twice daily for the next 2-3 weeks.  You can purchase this over the counter.  2 sprays each side.  This can be purchased over the counter. Generic is okay.  -You may feel more congested for 1-2 weeks after the treatment and that the full effect of the treatment may not be felt until 4 weeks after procedure.  -Return to clinic in 4-6 weeks for another assessment.        Please call our clinic for any questions,concerns,or worsening symptoms.  Clinic #[phone]   Option #3 for the triage nurse.

## 2017-11-02 NOTE — LETTER
11/2/2017       RE: Earnest Peterson  [address]  [place] [facility] [address]     Dear Colleague,    Thank you for referring your patient, Earnest Peterson, to the [facility] EAR NOSE AND THROAT at [facility]. Please see a copy of my visit note below.    CHIEF COMPLAINT:  Nasal congestion.      HISTORY OF PRESENT ILLNESS:  The patient returns to clinic today for followup of nasal congestion.  He completed his six week trial of Flonase.  He reports that the Flonase was only helpful temporarily.  When he would spray it, he felt like he could breathe a little bit better for maybe an hour and then it would wear off.  Currently, he still feels congested.      PHYSICAL EXAMINATION:   GENERAL:  No acute distress.     NASAL CAVITY:  Septum still has a large spur to the left.  He has slightly improved turbinate hypertrophy but still enlarged.  Nasal valve internal stenting did improve his nasal breathing.       PROCEDURE NOTE    Pre-Procedure Diagnosis: Bilateral Inferior Turbinate Hypertrophy    Post-Procedure Diagnosis: same    Procedure: Bilateral radiofrequency turbinate reductions with submucosal cautery    Surgeon: Chitra Hampton MD    Anesthesia: local anesthesia with 1% lidocaine with epi 1:100,000    Estimated Blood Loss: minimal    Specimens: none    Indications: Patient presented to clinic with complaints of nasal congestion.  Physical exam revealed patient had Bilateral inferior turbinate hypertrophy.  Hypertrophy and nasal congestion persist despite medical treatment with nasal steroid sprays.    Description: Written and informed consent was obtained prior to the procedure.  A time-out was taken to identify the patient, procedure, and site.  The turbinates were then numbed using topical 4% lidocaine.  1 mL of 1% lidocaine with epi was then injected into each turbinate.  The radiofrequency probe was then gently inserted submucosally into each turbinate at a setting of 17.      The  probe was then removed.  Hemostasis was achieved with afrin pledgets.  Patient had no sign of bleeding at the end of the procedure.  Patient tolerated the procedure well.    Assessment & Plan:  The patient presents with restricted nasal breathing.  I discussed with the patient options for management including bilateral inferior turbinate reductions in clinic versus a septoplasty with  grafts in the operating room.  The patient is not interested in a big surgery at this time.  He would like to first start with the inferior turbinate reductions and see if this helps.  Therefore, we decided to perform the procedure in clinic today given his minimal response to nasal steroids.       Radiofrequency reductions of Bilateral inferior turbinates.      - patient should avoid hard nose blowing for 24 hours  - patient should irrigate nasal with saline BID for the next 1-2 weeks  - pt counseled that they may feel more congested for 1-2 weeks after the treatment and that the full effect of treatment may not be felt until 4 weeks after procedure.  - patient should return to clinic in 6-8 weeks for re-evaluation.      Again, thank you for allowing me to participate in the care of your patient.      Sincerely,    Chitra Hampton MD

## 2017-11-02 NOTE — PROGRESS NOTES
CHIEF COMPLAINT:  Nasal congestion.      HISTORY OF PRESENT ILLNESS:  The patient returns to clinic today for followup of nasal congestion.  He completed his six week trial of Flonase.  He reports that the Flonase was only helpful temporarily.  When he would spray it, he felt like he could breathe a little bit better for maybe an hour and then it would wear off.  Currently, he still feels congested.      PHYSICAL EXAMINATION:   GENERAL:  No acute distress.     NASAL CAVITY:  Septum still has a large spur to the left.  He has slightly improved turbinate hypertrophy but still enlarged.  Nasal valve internal stenting did improve his nasal breathing.       PROCEDURE NOTE    Pre-Procedure Diagnosis: Bilateral Inferior Turbinate Hypertrophy    Post-Procedure Diagnosis: same    Procedure: Bilateral radiofrequency turbinate reductions with submucosal cautery    Surgeon: Chitra Hampton MD    Anesthesia: local anesthesia with 1% lidocaine with epi 1:100,000    Estimated Blood Loss: minimal    Specimens: none    Indications: Patient presented to clinic with complaints of nasal congestion.  Physical exam revealed patient had Bilateral inferior turbinate hypertrophy.  Hypertrophy and nasal congestion persist despite medical treatment with nasal steroid sprays.    Description: Written and informed consent was obtained prior to the procedure.  A time-out was taken to identify the patient, procedure, and site.  The turbinates were then numbed using topical 4% lidocaine.  1 mL of 1% lidocaine with epi was then injected into each turbinate.  The radiofrequency probe was then gently inserted submucosally into each turbinate at a setting of 17.      The probe was then removed.  Hemostasis was achieved with afrin pledgets.  Patient had no sign of bleeding at the end of the procedure.  Patient tolerated the procedure well.    Assessment & Plan:  The patient presents with restricted nasal breathing.  I discussed with the patient options  for management including bilateral inferior turbinate reductions in clinic versus a septoplasty with  grafts in the operating room.  The patient is not interested in a big surgery at this time.  He would like to first start with the inferior turbinate reductions and see if this helps.  Therefore, we decided to perform the procedure in clinic today given his minimal response to nasal steroids.       Radiofrequency reductions of Bilateral inferior turbinates.      - patient should avoid hard nose blowing for 24 hours  - patient should irrigate nasal with saline BID for the next 1-2 weeks  - pt counseled that they may feel more congested for 1-2 weeks after the treatment and that the full effect of treatment may not be felt until 4 weeks after procedure.  - patient should return to clinic in 6-8 weeks for re-evaluation.

## 2017-11-02 NOTE — NURSING NOTE
"Chief Complaint   Patient presents with     Procedure     Turbinate reduction      Height 1.93 m (6' 4\"), weight 108.9 kg (240 lb).    Chano Beltran    "

## 2017-12-14 ENCOUNTER — OFFICE VISIT (OUTPATIENT)
Dept: OTOLARYNGOLOGY | Facility: CLINIC | Age: 37
End: 2017-12-14
Payer: COMMERCIAL

## 2017-12-14 VITALS — BODY MASS INDEX: 29.35 KG/M2 | WEIGHT: 241 LBS | HEIGHT: 76 IN

## 2017-12-14 DIAGNOSIS — R09.81 NASAL CONGESTION: Primary | ICD-10-CM

## 2017-12-14 ASSESSMENT — PAIN SCALES - GENERAL: PAINLEVEL: NO PAIN (0)

## 2017-12-14 NOTE — PROGRESS NOTES
CC: s/p turbinate reductions in clinic    HPI:  HISTORY OF PRESENT ILLNESS:  The patient returns to clinic today after a radiofrequency turbinate reduction in clinic on 11/02/2017.  Overall, he does not feel like he had any significant improvement with his nasal breathing after the turbinate reductions.  Maybe for the first week or two he thought he felt some improvement, but really has now thinking about it, he does not really feel that much better.  To review, the patient has a lifelong history of difficulty breathing through his nose.  The left is always more restricted than the right.  He feels like in the last three to four years, the nasal breathing has gotten a lot worse.  He has tried Flonase, which I prescribed to him without significant improvement.      PHYSICAL EXAMINATION:   GENERAL:  No acute distress.   NOSE:  Septum does have a spur to the left.  He has narrow nostrils overall and his internal nasal valve area looks quite narrow.  His turbinates appear much thinner today.      ASSESSMENT AND PLAN:  The patient has nasally restricted breathing.  We did try Flonase as well as in-clinic turbinate reductions without any significant improvement in his breathing.  I discussed with him options for management going forward.  If he is really desiring better breathing through his nose, I would actually suggest a referral to our facial plastic surgeon for an evaluation for functional rhino and septoplasty.  I am wondering if the internal valves are part of his issues and improvement in the surface area would improve his breathing.  Overall, the patient just has some hesitancy about surgery because  he is not sure if he is ready to go through with a surgery, but he does want to understand the pros and cons of undergoing surgery and also to get an assessment of his nose to find out whether or not any surgery might be helpful for him.  He would like to meet with Dr. Corley and we will arrange this.        JH/ms     I spent a total of 15 minutes face-to-face with Earnest Peterson during today's office visit.  Over 50% of this time was spent counseling the patient on and/or coordinating care as documented in my assessment and plan.

## 2017-12-14 NOTE — PATIENT INSTRUCTIONS
Plan of Care:  1. Referral placed for you to see Dr. Leatha Corley    Clinic Information:  1. To schedule an appointment please call [phone], option 1  2. To talk to a Triage RN please call [phone] select option 3  3. To speak to Dr. Corley's nurse, Reji, please call [phone]    Reji Gonzalez RN  12/14/2017 8:59 AM

## 2017-12-14 NOTE — NURSING NOTE
"Chief Complaint   Patient presents with     RECHECK     Follow up post procedure Turbinate reduction      Height 1.93 m (6' 4\"), weight 109.3 kg (241 lb).    Chano Beltran    "

## 2017-12-14 NOTE — LETTER
12/14/2017       RE: Earnest Peterson  [address]  [place] [address]     Dear Colleague,    Thank you for referring your patient, Earnest Peterson, to the [facility] EAR NOSE AND THROAT at [facility]. Please see a copy of my visit note below.    CC: s/p turbinate reductions in clinic    HPI:  HISTORY OF PRESENT ILLNESS:  The patient returns to clinic today after a radiofrequency turbinate reduction in clinic on 11/02/2017.  Overall, he does not feel like he had any significant improvement with his nasal breathing after the turbinate reductions.  Maybe for the first week or two he thought he felt some improvement, but really has now thinking about it, he does not really feel that much better.  To review, the patient has a lifelong history of difficulty breathing through his nose.  The left is always more restricted than the right.  He feels like in the last three to four years, the nasal breathing has gotten a lot worse.  He has tried Flonase, which I prescribed to him without significant improvement.      PHYSICAL EXAMINATION:   GENERAL:  No acute distress.   NOSE:  Septum does have a spur to the left.  He has narrow nostrils overall and his internal nasal valve area looks quite narrow.  His turbinates appear much thinner today.      ASSESSMENT AND PLAN:  The patient has nasally restricted breathing.  We did try Flonase as well as in-clinic turbinate reductions without any significant improvement in his breathing.  I discussed with him options for management going forward.  If he is really desiring better breathing through his nose, I would actually suggest a referral to our facial plastic surgeon for an evaluation for functional rhino and septoplasty.  I am wondering if the internal valves are part of his issues and improvement in the surface area would improve his breathing.  Overall, the patient just has some hesitancy about surgery because  he is not sure if he is ready  to go through with a surgery, but he does want to understand the pros and cons of undergoing surgery and also to get an assessment of his nose to find out whether or not any surgery might be helpful for him.  He would like to meet with Dr. Corley and we will arrange this.       JH/ms     I spent a total of 15 minutes face-to-face with Earnest Peterson during today's office visit.  Over 50% of this time was spent counseling the patient on and/or coordinating care as documented in my assessment and plan.      Again, thank you for allowing me to participate in the care of your patient.      Sincerely,    Chitra Hampton MD

## 2017-12-14 NOTE — MR AVS SNAPSHOT
After Visit Summary   12/14/2017    Earnest Peterson    MRN: [id number]           Patient Information     Date Of Birth          1980        Visit Information        Provider Department      12/14/2017 8:45 AM Chitra Hampton MD [facility]        Today's Diagnoses     Nasal congestion    -  1      Care Instructions    Plan of Care:  1. Referral placed for you to see Dr. Leatha Corley    [facility] Information:  1. To schedule an appointment please call [phone], option 1  2. To talk to a Triage RN please call [phone] select option 3  3. To speak to Dr. Corley's nurse, Reji, please call [phone]    Reji Gonzalez RN  12/14/2017 8:59 AM            Follow-ups after your visit        Your next 10 appointments already scheduled     Mar 14, 2018  1:20 PM CDT   (Arrive by 1:05 PM)   New Patient Visit with MD SHERYL [facility] ([facility])    [address] [place] [id number] [phone]              Who to contact     Please call your clinic at [phone] to:    Ask questions about your health    Make or cancel appointments    Discuss your medicines    Learn about your test results    Speak to your doctor   If you have compliments or concerns about an experience at your clinic, or if you wish to file a complaint, please contact [facility] Patient Relations at [phone] or email us at [email or web address]         Additional Information About Your Visit        MyChart Information     [employer] gives you secure access to your electronic health record. If you see a primary care provider, you can also send messages to your care team and make appointments. If you have questions, please call your primary care clinic.  If you do not have a primary care provider, please call [phone] and they will assist you.      [employer] is an  "electronic gateway that provides easy, online access to your medical records. With [employer], you can request a clinic appointment, read your test results, renew a prescription or communicate with your care team.     To access your existing account, please contact your [facility] or call [phone] for assistance.        Care EveryWhere ID     This is your Care EveryWhere ID. This could be used by other organizations to access your [place] medical records  [id number]        Your Vitals Were     Height BMI (Body Mass Index)                1.93 m (6' 4\") 29.34 kg/m2           Blood Pressure from Last 3 Encounters:   10/09/17 113/78   04/28/17 118/66   03/08/17 126/81    Weight from Last 3 Encounters:   12/14/17 109.3 kg (241 lb)   11/02/17 108.9 kg (240 lb)   10/17/17 107.5 kg (237 lb)              Today, you had the following     No orders found for display       Primary Care Provider Office Phone # Fax #    BRINDA Olson [facility] [phone]       [facility] [address]  [facility] [address]        Equal Access to Services     FAVIOLA MATHEWS : Hadii aad ku hadasho Soomaali, waaxda luqadaha, qaybta kaalmada adeegyada, waxay idiin haynancyn adedanna brasher lashira . So [facility] [phone].    ATENCIÓN: Si habla español, tiene a henry disposición servicios gratuitos de asistencia lingüística. Llame al [phone].    We comply with applicable federal civil rights laws and [place] laws. We do not discriminate on the basis of race, color, national origin, age, disability, sex, sexual orientation, or gender identity.            Thank you!     Thank you for choosing [facility]  for your care. Our goal is always to provide you with excellent care. Hearing back from our patients is one way we can continue to improve our services. Please take a few minutes to complete the written survey that you may receive in the mail after your visit with us. Thank " you!             Your Updated Medication List - Protect others around you: Learn how to safely use, store and throw away your medicines at www.disposemymeds.org.          This list is accurate as of: 12/14/17  9:16 AM.  Always use your most recent med list.                   Brand Name Dispense Instructions for use Diagnosis    fluticasone 27.5 MCG/SPRAY spray    VERAMYST     Spray 2 sprays into both nostrils daily

## 2018-02-15 NOTE — TELEPHONE ENCOUNTER
APPT INFO    Date /Time: 3/14/18   Reason for Appt: Nasal congestion   Ref Provider/Clinic: Dr. Hampton   Are there internal records? Yes/No?  IF YES, list clinic names: Yes;   [facility]   Are there outside records? Yes/No? No   Patient Contact (Y/N) & Call Details: No   Action: Chart Reviewed

## 2018-03-14 ENCOUNTER — PRE VISIT (OUTPATIENT)
Dept: OTOLARYNGOLOGY | Facility: CLINIC | Age: 38
End: 2018-03-14

## 2019-01-28 ENCOUNTER — OFFICE VISIT (OUTPATIENT)
Dept: CARDIOLOGY | Facility: CLINIC | Age: 39
End: 2019-01-28
Payer: COMMERCIAL

## 2019-01-28 VITALS
HEART RATE: 76 BPM | SYSTOLIC BLOOD PRESSURE: 116 MMHG | HEIGHT: 76 IN | BODY MASS INDEX: 29.71 KG/M2 | WEIGHT: 244 LBS | DIASTOLIC BLOOD PRESSURE: 84 MMHG

## 2019-01-28 DIAGNOSIS — I49.3 PVC'S (PREMATURE VENTRICULAR CONTRACTIONS): ICD-10-CM

## 2019-01-28 DIAGNOSIS — I49.3 RIGHT VENTRICULAR OUTFLOW TRACT PREMATURE VENTRICULAR CONTRACTIONS (PVCS): Primary | ICD-10-CM

## 2019-01-28 PROBLEM — I42.8 CARDIOMYOPATHY, NONISCHEMIC (H): Status: ACTIVE | Noted: 2019-01-28

## 2019-01-28 PROCEDURE — 99214 OFFICE O/P EST MOD 30 MIN: CPT | Performed by: INTERNAL MEDICINE

## 2019-01-28 ASSESSMENT — MIFFLIN-ST. JEOR: SCORE: 2118.28

## 2019-01-28 NOTE — LETTER
1/28/2019    Yarely Gonsalves, APRN CNP  [address]  [facility] [address]    RE: Earnest KHARI Peterson       Dear Colleague,    I had the pleasure of seeing Earnest Peterson in the [facility].    HPI and Plan:   Mr. Peterson is a delightful 38-year-old gentleman here today for followup.       He has h/o  symptomatic PVCs for about 3 years and eventually underwent EP study and ablation in March 2017 by electrophysiologist.  He was noted to have RV outflow PVC that was ablated. Five ablations were necessary for complete elimination of PVCs..      Prior to that he had some atypical chest pain and had a cardiac MRI showed EF of 53% with normal size.  There was some delayed enhancement there was atypical there was felt to be consistent with prior myocarditis.  Sarcoid and left-sided arrhythmic cardia myopathy were in the differential but appears less likely.  His CRP at that time was borderline elevated but then normalized.  His repeat echocardiogram in 2017 showed normal LV size and function.      He has done well for a while.  He however had 2 episodes of palpitations recently and therefore he made his appointment.  About 3 weeks ago he was in [place] visiting friends and went bar hopping and slept less.  Next morning when he had a flight he had 2 minutes of intermittent palpitations with extra beats somewhat similar to his prior episodes.  The heartbeat was thumping heavily.  It resolved on its own within 2 minutes.  He attributed that to drinking heavily with the prior evening and not sleeping well.  However, about a week ago, he had similar symptoms without using alcohol prior to that.  This 1 lasted only a minute.    He has had no chest pain or orthopnea or PND.  He has had some shortness of breath as he has gained 20 pounds.      Physical examination, see below    ASSESSMENT AND PLAN:      1.  Recurrent episodes of palpitations, somewhat brief   Patient had 2 episodes of  palpitations one lasting 2 minutes and one lasting minute which was somewhat similar to his previous episodes and he felt that his heart was irregular and thumping.  There was no chest pain or shortness of breath.  He is concerned that there might be some recurrence of his PVCs.  I have recommended a 3-week event monitor to assess this further.  We talked about decreasing caffeine and alcohol intake.  Also, work on his weight and change his diet and follow a low-calorie diet and lose weight.    I would also recommend an echocardiogram to make sure the LV size and function are normal.    2.  History of RVOT PVCs with successful ablation in 2017    3.  Remote history of chest pain with MRI showing some delayed enhancement showing some mid myocardial/ subepicardial scarring thought to be possible prior myocarditis.  Echocardiogram since then has shown normal LV size and function.    I will have him see Dr. Castano after the event monitor and echocardiogram.  He is continued to see me in follow-up on an annual or every 2-year basis.       Sincerely,     Jose Cruz Lopez MD                     No orders of the defined types were placed in this encounter.        There are no discontinued medications.         Orders Placed This Encounter   Procedures     Follow-Up with Electrophysiologist     Cardiac Event Monitor Adult Pediatric     Echocardiogram Complete       No orders of the defined types were placed in this encounter.      There are no discontinued medications.      Encounter Diagnoses   Name Primary?     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) Yes       CURRENT MEDICATIONS:  Current Outpatient Medications   Medication Sig Dispense Refill     fluticasone (VERAMYST) 27.5 MCG/SPRAY spray Spray 2 sprays into both nostrils daily         ALLERGIES     Allergies   Allergen Reactions     Nkda [No Known Drug Allergies]        PAST MEDICAL HISTORY:  Past  Medical History:   Diagnosis Date     Allergic rhinitis      Heart rate problem 10/1/2015     PVC's (premature ventricular contractions)      Recurrent cholesteatoma of postmastoidectomy cavity        PAST SURGICAL HISTORY:  Past Surgical History:   Procedure Laterality Date     ABDOMEN SURGERY      intestial surgery as an infant     GI SURGERY  1978     MASTOIDECTOMY       TYMPANOPLASTY  3/1/1996       FAMILY HISTORY:  Family History   Problem Relation Age of Onset     Diabetes Father         type 2     Hypertension Father      Myocardial Infarction Father      Heart Disease Father      Heart Surgery Father 52        bypass     Arrhythmia Father      Obesity Father      Kidney Disease Mother      Myocardial Infarction Maternal Grandfather      Family History Negative Paternal Grandmother      Family History Negative Paternal Grandfather      Family History Negative Brother      Family History Negative Maternal Grandmother        SOCIAL HISTORY:  Social History     Socioeconomic History     Marital status:      Spouse name: None     Number of children: None     Years of education: None     Highest education level: None   Social Needs     Financial resource strain: None     Food insecurity - worry: None     Food insecurity - inability: None     Transportation needs - medical: None     Transportation needs - non-medical: None   Occupational History     Occupation: Sales - Software.    Tobacco Use     Smoking status: Never Smoker     Smokeless tobacco: Never Used   Substance and Sexual Activity     Alcohol use: Yes     Comment: 1 glass wine, 2 days week      Drug use: No     Sexual activity: Yes     Partners: Female   Other Topics Concern     Parent/sibling w/ CABG, MI or angioplasty before 65F 55M? Yes     Comment: father      Service Not Asked     Blood Transfusions Not Asked     Caffeine Concern No     Comment: 1 cup per week     Occupational Exposure Not Asked     Hobby Hazards Not Asked      "Sleep Concern No     Stress Concern Yes     Weight Concern No     Special Diet No     Back Care Not Asked     Exercise Yes     Comment: runs 1-2 days week     Bike Helmet Not Asked     Seat Belt Not Asked     Self-Exams Not Asked   Social History Narrative     None       Review of Systems:  Skin:  Negative       Eyes:  Negative      ENT:  Negative      Respiratory:  Positive for dyspnea on exertion     Cardiovascular:    palpitations;Positive for;fatigue episonde 1/19/19 while in [place] lasting about 2-3 minutes and one 1/20/2019 lasting about one minute  Gastroenterology: Negative      Genitourinary:  Negative      Musculoskeletal:  Negative      Neurologic:  Positive for headaches    Psychiatric:  Negative      Heme/Lymph/Imm:  Negative      Endocrine:  Negative        Physical Exam:  Vitals: /84   Pulse 76   Ht 1.93 m (6' 4\")   Wt 110.7 kg (244 lb)   BMI 29.70 kg/m       Constitutional:           Skin:  warm and dry to the touch          Head:  no masses or lesions        Eyes:  pupils equal and round        Lymph:      ENT:  dentition good        Neck:  carotid pulses are full and equal bilaterally;JVP normal        Respiratory:  clear to auscultation         Cardiac: regular rhythm;normal S1 and S2 occasional premature beats S4 no presence of murmur          not assessed this visit                                        GI:  abdomen soft;BS normoactive        Extremities and Muscular Skeletal:  no edema              Neurological:  no gross motor deficits        Psych:           CC  BRINDA Olson CNP  [address]  [place] [address]                Thank you for allowing me to participate in the care of your patient.      Sincerely,     Jose Cruz Lopez MD     [facility]    cc:   BRINDA Olson CNP  [address]  [place] [address]        "

## 2019-01-28 NOTE — LETTER
1/28/2019    Yarely Gonsalves, APRN CNP  [address]  [facility] [address]    RE: Eanrest KHARI Peterson       Dear Colleague,    I had the pleasure of seeing Earnest Peterson in the [facility].    HPI and Plan:   Mr. Peterson is a delightful 38-year-old gentleman here today for followup.       He has h/o  symptomatic PVCs for about 3 years and eventually underwent EP study and ablation in March 2017 by electrophysiologist.  He was noted to have RV outflow PVC that was ablated. Five ablations were necessary for complete elimination of PVCs..      Prior to that he had some atypical chest pain and had a cardiac MRI showed EF of 53% with normal size.  There was some delayed enhancement there was atypical there was felt to be consistent with prior myocarditis.  Sarcoid and left-sided arrhythmic cardia myopathy were in the differential but appears less likely.  His CRP at that time was borderline elevated but then normalized.  His repeat echocardiogram in 2017 showed normal LV size and function.      He has done well for a while.  He however had 2 episodes of palpitations recently and therefore he made his appointment.  About 3 weeks ago he was in [place] visiting friends and went bar hopping and slept less.  Next morning when he had a flight he had 2 minutes of intermittent palpitations with extra beats somewhat similar to his prior episodes.  The heartbeat was thumping heavily.  It resolved on its own within 2 minutes.  He attributed that to drinking heavily with the prior evening and not sleeping well.  However, about a week ago, he had similar symptoms without using alcohol prior to that.  This 1 lasted only a minute.    He has had no chest pain or orthopnea or PND.  He has had some shortness of breath as he has gained 20 pounds.      Physical examination, see below    ASSESSMENT AND PLAN:      1.  Recurrent episodes of palpitations, somewhat brief   Patient had 2 episodes of  palpitations one lasting 2 minutes and one lasting minute which was somewhat similar to his previous episodes and he felt that his heart was irregular and thumping.  There was no chest pain or shortness of breath.  He is concerned that there might be some recurrence of his PVCs.  I have recommended a 3-week event monitor to assess this further.  We talked about decreasing caffeine and alcohol intake.  Also, work on his weight and change his diet and follow a low-calorie diet and lose weight.    I would also recommend an echocardiogram to make sure the LV size and function are normal.    2.  History of RVOT PVCs with successful ablation in 2017    3.  Remote history of chest pain with MRI showing some delayed enhancement showing some mid myocardial/ subepicardial scarring thought to be possible prior myocarditis.  Echocardiogram since then has shown normal LV size and function.    I will have him see Dr. Castano after the event monitor and echocardiogram.  He is continued to see me in follow-up on an annual or every 2-year basis.       Sincerely,     Jose Cruz Lopez MD                     No orders of the defined types were placed in this encounter.        There are no discontinued medications.         Orders Placed This Encounter   Procedures     Follow-Up with Electrophysiologist     Cardiac Event Monitor Adult Pediatric     Echocardiogram Complete       No orders of the defined types were placed in this encounter.      There are no discontinued medications.      Encounter Diagnoses   Name Primary?     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) Yes       CURRENT MEDICATIONS:  Current Outpatient Medications   Medication Sig Dispense Refill     fluticasone (VERAMYST) 27.5 MCG/SPRAY spray Spray 2 sprays into both nostrils daily         ALLERGIES     Allergies   Allergen Reactions     Nkda [No Known Drug Allergies]        PAST MEDICAL HISTORY:  Past  Medical History:   Diagnosis Date     Allergic rhinitis      Heart rate problem 10/1/2015     PVC's (premature ventricular contractions)      Recurrent cholesteatoma of postmastoidectomy cavity        PAST SURGICAL HISTORY:  Past Surgical History:   Procedure Laterality Date     ABDOMEN SURGERY      intestial surgery as an infant     GI SURGERY  1978     MASTOIDECTOMY       TYMPANOPLASTY  3/1/1996       FAMILY HISTORY:  Family History   Problem Relation Age of Onset     Diabetes Father         type 2     Hypertension Father      Myocardial Infarction Father      Heart Disease Father      Heart Surgery Father 52        bypass     Arrhythmia Father      Obesity Father      Kidney Disease Mother      Myocardial Infarction Maternal Grandfather      Family History Negative Paternal Grandmother      Family History Negative Paternal Grandfather      Family History Negative Brother      Family History Negative Maternal Grandmother        SOCIAL HISTORY:  Social History     Socioeconomic History     Marital status:      Spouse name: None     Number of children: None     Years of education: None     Highest education level: None   Social Needs     Financial resource strain: None     Food insecurity - worry: None     Food insecurity - inability: None     Transportation needs - medical: None     Transportation needs - non-medical: None   Occupational History     Occupation: Sales - Software.    Tobacco Use     Smoking status: Never Smoker     Smokeless tobacco: Never Used   Substance and Sexual Activity     Alcohol use: Yes     Comment: 1 glass wine, 2 days week      Drug use: No     Sexual activity: Yes     Partners: Female   Other Topics Concern     Parent/sibling w/ CABG, MI or angioplasty before 65F 55M? Yes     Comment: father      Service Not Asked     Blood Transfusions Not Asked     Caffeine Concern No     Comment: 1 cup per week     Occupational Exposure Not Asked     Hobby Hazards Not Asked      "Sleep Concern No     Stress Concern Yes     Weight Concern No     Special Diet No     Back Care Not Asked     Exercise Yes     Comment: runs 1-2 days week     Bike Helmet Not Asked     Seat Belt Not Asked     Self-Exams Not Asked   Social History Narrative     None       Review of Systems:  Skin:  Negative       Eyes:  Negative      ENT:  Negative      Respiratory:  Positive for dyspnea on exertion     Cardiovascular:    palpitations;Positive for;fatigue episonde 1/19/19 while in [place] lasting about 2-3 minutes and one 1/20/2019 lasting about one minute  Gastroenterology: Negative      Genitourinary:  Negative      Musculoskeletal:  Negative      Neurologic:  Positive for headaches    Psychiatric:  Negative      Heme/Lymph/Imm:  Negative      Endocrine:  Negative        Physical Exam:  Vitals: /84   Pulse 76   Ht 1.93 m (6' 4\")   Wt 110.7 kg (244 lb)   BMI 29.70 kg/m       Constitutional:           Skin:  warm and dry to the touch          Head:  no masses or lesions        Eyes:  pupils equal and round        Lymph:      ENT:  dentition good        Neck:  carotid pulses are full and equal bilaterally;JVP normal        Respiratory:  clear to auscultation         Cardiac: regular rhythm;normal S1 and S2 occasional premature beats S4 no presence of murmur          not assessed this visit                                        GI:  abdomen soft;BS normoactive        Extremities and Muscular Skeletal:  no edema              Neurological:  no gross motor deficits        Psych:           CC  Yarely Gonsalves, APRN CNP  [address]  [place] [address]                Thank you for allowing me to participate in the care of your patient.      Sincerely,     Jose Cruz Lopez MD     [facility]    "

## 2019-01-28 NOTE — PROGRESS NOTES
HPI and Plan:   Mr. Peterson is a delightful 38-year-old gentleman here today for followup.       He has h/o  symptomatic PVCs for about 3 years and eventually underwent EP study and ablation in March 2017 by electrophysiologist.  He was noted to have RV outflow PVC that was ablated. Five ablations were necessary for complete elimination of PVCs..      Prior to that he had some atypical chest pain and had a cardiac MRI showed EF of 53% with normal size.  There was some delayed enhancement there was atypical there was felt to be consistent with prior myocarditis.  Sarcoid and left-sided arrhythmic cardia myopathy were in the differential but appears less likely.  His CRP at that time was borderline elevated but then normalized.  His repeat echocardiogram in 2017 showed normal LV size and function.      He has done well for a while.  He however had 2 episodes of palpitations recently and therefore he made his appointment.  About 3 weeks ago he was in [place] visiting friends and went bar hopping and slept less.  Next morning when he had a flight he had 2 minutes of intermittent palpitations with extra beats somewhat similar to his prior episodes.  The heartbeat was thumping heavily.  It resolved on its own within 2 minutes.  He attributed that to drinking heavily with the prior evening and not sleeping well.  However, about a week ago, he had similar symptoms without using alcohol prior to that.  This 1 lasted only a minute.    He has had no chest pain or orthopnea or PND.  He has had some shortness of breath as he has gained 20 pounds.      Physical examination, see below    ASSESSMENT AND PLAN:      1.  Recurrent episodes of palpitations, somewhat brief   Patient had 2 episodes of palpitations one lasting 2 minutes and one lasting minute which was somewhat similar to his previous episodes and he felt that his heart was irregular and thumping.  There was no chest pain or shortness of breath.  He is concerned that there  might be some recurrence of his PVCs.  I have recommended a 3-week event monitor to assess this further.  We talked about decreasing caffeine and alcohol intake.  Also, work on his weight and change his diet and follow a low-calorie diet and lose weight.    I would also recommend an echocardiogram to make sure the LV size and function are normal.    2.  History of RVOT PVCs with successful ablation in 2017    3.  Remote history of chest pain with MRI showing some delayed enhancement showing some mid myocardial/ subepicardial scarring thought to be possible prior myocarditis.  Echocardiogram since then has shown normal LV size and function.    I will have him see Dr. Castano after the event monitor and echocardiogram.  He is continued to see me in follow-up on an annual or every 2-year basis.       Sincerely,     Jose Cruz Lopez MD                     No orders of the defined types were placed in this encounter.        There are no discontinued medications.         Orders Placed This Encounter   Procedures     Follow-Up with Electrophysiologist     Cardiac Event Monitor Adult Pediatric     Echocardiogram Complete       No orders of the defined types were placed in this encounter.      There are no discontinued medications.      Encounter Diagnoses   Name Primary?     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) Yes       CURRENT MEDICATIONS:  Current Outpatient Medications   Medication Sig Dispense Refill     fluticasone (VERAMYST) 27.5 MCG/SPRAY spray Spray 2 sprays into both nostrils daily         ALLERGIES     Allergies   Allergen Reactions     Nkda [No Known Drug Allergies]        PAST MEDICAL HISTORY:  Past Medical History:   Diagnosis Date     Allergic rhinitis      Heart rate problem 10/1/2015     PVC's (premature ventricular contractions)      Recurrent cholesteatoma of postmastoidectomy cavity        PAST SURGICAL HISTORY:  Past Surgical  History:   Procedure Laterality Date     ABDOMEN SURGERY      intestial surgery as an infant     GI SURGERY  1978     MASTOIDECTOMY       TYMPANOPLASTY  3/1/1996       FAMILY HISTORY:  Family History   Problem Relation Age of Onset     Diabetes Father         type 2     Hypertension Father      Myocardial Infarction Father      Heart Disease Father      Heart Surgery Father 52        bypass     Arrhythmia Father      Obesity Father      Kidney Disease Mother      Myocardial Infarction Maternal Grandfather      Family History Negative Paternal Grandmother      Family History Negative Paternal Grandfather      Family History Negative Brother      Family History Negative Maternal Grandmother        SOCIAL HISTORY:  Social History     Socioeconomic History     Marital status:      Spouse name: None     Number of children: None     Years of education: None     Highest education level: None   Social Needs     Financial resource strain: None     Food insecurity - worry: None     Food insecurity - inability: None     Transportation needs - medical: None     Transportation needs - non-medical: None   Occupational History     Occupation: Sales - Software.    Tobacco Use     Smoking status: Never Smoker     Smokeless tobacco: Never Used   Substance and Sexual Activity     Alcohol use: Yes     Comment: 1 glass wine, 2 days week      Drug use: No     Sexual activity: Yes     Partners: Female   Other Topics Concern     Parent/sibling w/ CABG, MI or angioplasty before 65F 55M? Yes     Comment: father      Service Not Asked     Blood Transfusions Not Asked     Caffeine Concern No     Comment: 1 cup per week     Occupational Exposure Not Asked     Hobby Hazards Not Asked     Sleep Concern No     Stress Concern Yes     Weight Concern No     Special Diet No     Back Care Not Asked     Exercise Yes     Comment: runs 1-2 days week     Bike Helmet Not Asked     Seat Belt Not Asked     Self-Exams Not Asked   Social  "History Narrative     None       Review of Systems:  Skin:  Negative       Eyes:  Negative      ENT:  Negative      Respiratory:  Positive for dyspnea on exertion     Cardiovascular:    palpitations;Positive for;fatigue episonde 1/19/19 while in [place] lasting about 2-3 minutes and one 1/20/2019 lasting about one minute  Gastroenterology: Negative      Genitourinary:  Negative      Musculoskeletal:  Negative      Neurologic:  Positive for headaches    Psychiatric:  Negative      Heme/Lymph/Imm:  Negative      Endocrine:  Negative        Physical Exam:  Vitals: /84   Pulse 76   Ht 1.93 m (6' 4\")   Wt 110.7 kg (244 lb)   BMI 29.70 kg/m      Constitutional:           Skin:  warm and dry to the touch          Head:  no masses or lesions        Eyes:  pupils equal and round        Lymph:      ENT:  dentition good        Neck:  carotid pulses are full and equal bilaterally;JVP normal        Respiratory:  clear to auscultation         Cardiac: regular rhythm;normal S1 and S2 occasional premature beats S4 no presence of murmur          not assessed this visit                                        GI:  abdomen soft;BS normoactive        Extremities and Muscular Skeletal:  no edema              Neurological:  no gross motor deficits        Psych:           CC  Yarely Gonsalves, APRN CNP  [address]  [place] [address]              "

## 2019-01-30 ENCOUNTER — HOSPITAL ENCOUNTER (OUTPATIENT)
Dept: CARDIOLOGY | Facility: CLINIC | Age: 39
Discharge: HOME | End: 2019-01-30
Attending: INTERNAL MEDICINE | Admitting: INTERNAL MEDICINE
Payer: COMMERCIAL

## 2019-01-30 DIAGNOSIS — I49.3 RIGHT VENTRICULAR OUTFLOW TRACT PREMATURE VENTRICULAR CONTRACTIONS (PVCS): ICD-10-CM

## 2019-01-30 PROCEDURE — 93306 TTE W/DOPPLER COMPLETE: CPT | Mod: 26 | Performed by: INTERNAL MEDICINE

## 2019-01-30 PROCEDURE — 93306 TTE W/DOPPLER COMPLETE: CPT

## 2019-01-30 PROCEDURE — 93270 REMOTE 30 DAY ECG REV/REPORT: CPT

## 2019-01-30 PROCEDURE — 93272 ECG/REVIEW INTERPRET ONLY: CPT | Performed by: INTERNAL MEDICINE

## 2019-02-20 ENCOUNTER — TELEPHONE (OUTPATIENT)
Dept: CARDIOLOGY | Facility: CLINIC | Age: 39
End: 2019-02-20

## 2019-02-20 NOTE — TELEPHONE ENCOUNTER
Pt wearing event monitor. Strips received 1/30/19 NSR HR 69, Sinus tach 145 hr. 2/2/19 sinus tach HR 64. JUAN Curtis RN

## 2019-03-14 NOTE — PATIENT INSTRUCTIONS
Send biometric results yearly - fax [phone]    Schedule vasectomy - Dr. Ford    Preventive Health Recommendations  Male Ages 40 to 49    Yearly exam:             See your health care provider every year in order to  o   Review health changes.   o   Discuss preventive care.    o   Review your medicines if your doctor has prescribed any.    You should be tested each year for STDs (sexually transmitted diseases) if you re at risk.     Have a cholesterol test every 5 years.     Have a colonoscopy (test for colon cancer) if someone in your family has had colon cancer or polyps before age 50.     After age 45, have a diabetes test (fasting glucose). If you are at risk for diabetes, you should have this test every 3 years.      Talk with your health care provider about whether or not a prostate cancer screening test (PSA) is right for you.    Shots: Get a flu shot each year. Get a tetanus shot every 10 years.     Nutrition:    Eat at least 5 servings of fruits and vegetables daily.     Eat whole-grain bread, whole-wheat pasta and brown rice instead of white grains and rice.     Get adequate Calcium and Vitamin D.     Lifestyle    Exercise for at least 150 minutes a week (30 minutes a day, 5 days a week). This will help you control your weight and prevent disease.     Limit alcohol to one drink per day.     No smoking.     Wear sunscreen to prevent skin cancer.     See your dentist every six months for an exam and cleaning.

## 2019-03-14 NOTE — PROGRESS NOTES
"  SUBJECTIVE:   CC: Earnest Peterson is an 38 year old male who presents for preventive health visit.     Healthy Habits:    Do you get at least three servings of calcium containing foods daily (dairy, green leafy vegetables, etc.)? yes    Amount of exercise or daily activities, outside of work: none    Problems taking medications regularly No    Medication side effects: No    Have you had an eye exam in the past two years? no    Do you see a dentist twice per year? yes    Do you have sleep apnea, excessive snoring or daytime drowsiness? yes      Depression and Anxiety Follow-Up    Status since last visit: Improved     Other associated symptoms:None    Complicating factors:     Significant life event: No     Current substance abuse: None    PHQ 3/15/2019 3/15/2019   PHQ-9 Total Score 5 3   Q9: Suicide Ideation Not at all Not at all     MITCH-7 SCORE 10/30/2014 3/15/2019   Total Score 10 -   Total Score - 1     In the past two weeks have you had thoughts of suicide or self-harm?  No.    Do you have concerns about your personal safety or the safety of others?   No    Abuse: Current or Past(Physical, Sexual or Emotional)- No  Do you feel safe in your environment? Yes    Social History     Tobacco Use     Smoking status: Never Smoker     Smokeless tobacco: Never Used   Substance Use Topics     Alcohol use: Yes     Comment: 1 glass wine, 2 days week      If you drink alcohol do you typically have >3 drinks per day or >7 drinks per week? No                      Last PSA: No results found for: PSA    Reviewed orders with patient. Reviewed health maintenance and updated orders accordingly - Yes  Labs reviewed in EPIC    Reviewed and updated as needed this visit by clinical staff  Tobacco  Allergies  Meds         Reviewed and updated as needed this visit by Provider        Cardiology 1/2019 AP - had two episodes of \"strong irregular beats\" this year so far and returned to cards.     \"ASSESSMENT AND PLAN:       1.  Recurrent " "episodes of palpitations, somewhat brief   Patient had 2 episodes of palpitations one lasting 2 minutes and one lasting minute which was somewhat similar to his previous episodes and he felt that his heart was irregular and thumping.  There was no chest pain or shortness of breath.  He is concerned that there might be some recurrence of his PVCs.  I have recommended a 3-week event monitor to assess this further.  We talked about decreasing caffeine and alcohol intake.  Also, work on his weight and change his diet and follow a low-calorie diet and lose weight.     I would also recommend an echocardiogram to make sure the LV size and function are normal.     2.  History of RVOT PVCs with successful ablation in 2017     3.  Remote history of chest pain with MRI showing some delayed enhancement showing some mid myocardial/ subepicardial scarring thought to be possible prior myocarditis.  Echocardiogram since then has shown normal LV size and function.     I will have him see Dr. Castano after the event monitor and echocardiogram.  He is continued to see me in follow-up on an annual or every 2-year basis.    Sincerely,     Jose Cruz Lopez MD\"    2019 ECHO was normal    Got a promotion at work this week and landed a million dollar order.  Will travel more, but not excessively.  He is good at his job, but it has been a perfect fit or comfortable.  Despite that he enjoys it and is feeling more confident.  On reflection of the episode of depression and anxiety a couple years ago he feels it was the stress of many life changes all at once.  These symptoms have resolved and he denies any current depression or anxiety symptoms.    He does have questions about feeling tired all the time.  He had experienced this previously before his arrhythmia dx and then it had improved after ablation.  Now is getting worse again.  Goes to be typically at 10 pm, but at least a couple times a week he falls asleep at 8 pm on the couch or putting Gage " to bed.  Alarm set for 6:05 am and up by 6:30 am. He doesn't experience excessive daytime sleepiness.  Does drink more coffee than previously, but only decaf at home and in the evening.  He has snored for a long time, but does not wake in the night at all and never had had witnessed or experienced apneic episodes.  He does have a deviated septum and narrow nasal passages.  A friend felt better after supplementing vitamin D and he'd like to check that.  He has the cardiac event monitor follow-up today.    He has gained weight since the fall and is currently at a peak of 244 lb.  He tends to bike very often - daily at least an hour - in the summer and is signed up for the  in June, but has had extended winter/sprng of being unable to bike outside.  Just got an indoor  and restarted biking inside in order to train.    ROS:  CONSTITUTIONAL: NEGATIVE for fever, chills, change in weight  INTEGUMENTARY/SKIN: NEGATIVE for worrisome rashes, moles or lesions  EYES: NEGATIVE for vision changes or irritation  ENT: NEGATIVE for ear, mouth and throat problems  RESP: NEGATIVE for significant cough or SOB  CV: NEGATIVE for chest pain, palpitations or peripheral edema  GI: NEGATIVE for nausea, abdominal pain, heartburn, or change in bowel habits   male: negative for dysuria, hematuria, decreased urinary stream, erectile dysfunction, urethral discharge  MUSCULOSKELETAL: NEGATIVE for significant arthralgias or myalgia  NEURO: NEGATIVE for weakness, dizziness or paresthesias  PSYCHIATRIC: NEGATIVE for changes in mood or affect    OBJECTIVE:   /70   Pulse 69   Temp 98.2  F (36.8  C) (Oral)   Wt 109.9 kg (242 lb 3.2 oz)   SpO2 97%   BMI 29.48 kg/m    EXAM:  GENERAL: healthy, alert and no distress  EYES: Eyes grossly normal to inspection, PERRL and conjunctivae and sclerae normal  HENT: ear canals and TM's normal, nose and mouth without ulcers or lesions  NECK: no adenopathy, no asymmetry, masses, or scars and  thyroid normal to palpation  RESP: lungs clear to auscultation - no rales, rhonchi or wheezes  CV: regular rate and rhythm, normal S1 S2, no S3 or S4, no murmur, click or rub, no peripheral edema and peripheral pulses strong  ABDOMEN: soft, nontender, no hepatosplenomegaly, no masses and bowel sounds normal   (male): normal male genitalia without lesions or urethral discharge, no hernia  MS: no gross musculoskeletal defects noted, no edema  SKIN: no suspicious lesions or rashes  NEURO: Normal strength and tone, mentation intact and speech normal  PSYCH: mentation appears normal, affect normal/bright  LYMPH: no cervical, supraclavicular, axillary, or inguinal adenopathy    Diagnostic Test Results:  pending    ASSESSMENT/PLAN:   (Z00.01) Encounter for routine adult medical exam with abnormal findings  (primary encounter diagnosis)  Comment:   Plan:     (R53.83) Tiredness  Comment:   Plan: TSH with free T4 reflex        Discussed common experience of fatigue and uncommon to find diagnotic explanation.  Will check thyroid and vitamin D and await consult with cardiology regarding arrhythmia.  If no explanation with these, consider sleep study due to snoring.    (E55.9) Hypovitaminosis D  Comment:   Plan: Vitamin D Deficiency            (R06.83) Snoring  Comment:   Plan: Consider sleep study    (Z30.09) Encounter for vasectomy counseling  Comment:   Plan: UROLOGY ADULT REFERRAL            (F32.1) Moderate major depression (H)  Comment:  Plan: Single episode that has since resolved.  Taking off problem list    (F41.1) Generalized anxiety disorder  Comment:   Plan: Single episode that has since resolved.    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    Weight as of this encounter: 109.9 kg (242 lb 3.2 oz).      Weight management plan: Discussed healthy diet and exercise guidelines     reports that  has never smoked. he has never used smokeless tobacco.      Counseling Resources:  ATP IV Guidelines  Pooled Cohorts Equation Calculator  FRAX Risk Assessment  ICSI Preventive Guidelines  Dietary Guidelines for Americans, 2010  USDA's MyPlate  ASA Prophylaxis  Lung CA Screening    BRINDA Patton [facility]  "

## 2019-03-15 ENCOUNTER — OFFICE VISIT (OUTPATIENT)
Dept: FAMILY MEDICINE | Facility: CLINIC | Age: 39
End: 2019-03-15
Payer: COMMERCIAL

## 2019-03-15 ENCOUNTER — OFFICE VISIT (OUTPATIENT)
Dept: CARDIOLOGY | Facility: CLINIC | Age: 39
End: 2019-03-15
Payer: COMMERCIAL

## 2019-03-15 VITALS
DIASTOLIC BLOOD PRESSURE: 70 MMHG | OXYGEN SATURATION: 97 % | WEIGHT: 242.2 LBS | SYSTOLIC BLOOD PRESSURE: 110 MMHG | HEART RATE: 69 BPM | TEMPERATURE: 98.2 F | BODY MASS INDEX: 29.48 KG/M2

## 2019-03-15 VITALS
SYSTOLIC BLOOD PRESSURE: 122 MMHG | HEIGHT: 76 IN | DIASTOLIC BLOOD PRESSURE: 81 MMHG | WEIGHT: 249 LBS | BODY MASS INDEX: 30.32 KG/M2 | HEART RATE: 83 BPM

## 2019-03-15 DIAGNOSIS — R53.83 TIREDNESS: ICD-10-CM

## 2019-03-15 DIAGNOSIS — I49.3 RIGHT VENTRICULAR OUTFLOW TRACT PREMATURE VENTRICULAR CONTRACTIONS (PVCS): ICD-10-CM

## 2019-03-15 DIAGNOSIS — R06.83 SNORING: ICD-10-CM

## 2019-03-15 DIAGNOSIS — F41.1 GENERALIZED ANXIETY DISORDER: ICD-10-CM

## 2019-03-15 DIAGNOSIS — Z30.09 ENCOUNTER FOR VASECTOMY COUNSELING: ICD-10-CM

## 2019-03-15 DIAGNOSIS — Z00.01 ENCOUNTER FOR ROUTINE ADULT MEDICAL EXAM WITH ABNORMAL FINDINGS: Primary | ICD-10-CM

## 2019-03-15 DIAGNOSIS — F32.1 MODERATE MAJOR DEPRESSION (H): ICD-10-CM

## 2019-03-15 DIAGNOSIS — Z13.1 SCREENING FOR DIABETES MELLITUS: ICD-10-CM

## 2019-03-15 DIAGNOSIS — Z13.220 LIPID SCREENING: ICD-10-CM

## 2019-03-15 DIAGNOSIS — E55.9 HYPOVITAMINOSIS D: ICD-10-CM

## 2019-03-15 LAB
CHOLEST SERPL-MCNC: 179 MG/DL
DEPRECATED CALCIDIOL+CALCIFEROL SERPL-MC: 35 UG/L (ref 20–75)
GLUCOSE SERPL-MCNC: 89 MG/DL (ref 70–99)
HDLC SERPL-MCNC: 47 MG/DL
LDLC SERPL CALC-MCNC: 111 MG/DL
NONHDLC SERPL-MCNC: 132 MG/DL
TRIGL SERPL-MCNC: 107 MG/DL
TSH SERPL DL<=0.005 MIU/L-ACNC: 1.25 MU/L (ref 0.4–4)

## 2019-03-15 PROCEDURE — 80061 LIPID PANEL: CPT | Performed by: NURSE PRACTITIONER

## 2019-03-15 PROCEDURE — 84443 ASSAY THYROID STIM HORMONE: CPT | Performed by: NURSE PRACTITIONER

## 2019-03-15 PROCEDURE — 82947 ASSAY GLUCOSE BLOOD QUANT: CPT | Performed by: NURSE PRACTITIONER

## 2019-03-15 PROCEDURE — 99396 PREV VISIT EST AGE 40-64: CPT | Performed by: NURSE PRACTITIONER

## 2019-03-15 PROCEDURE — 99213 OFFICE O/P EST LOW 20 MIN: CPT | Mod: 25 | Performed by: NURSE PRACTITIONER

## 2019-03-15 PROCEDURE — 36415 COLL VENOUS BLD VENIPUNCTURE: CPT | Performed by: NURSE PRACTITIONER

## 2019-03-15 PROCEDURE — 82306 VITAMIN D 25 HYDROXY: CPT | Performed by: NURSE PRACTITIONER

## 2019-03-15 PROCEDURE — 99213 OFFICE O/P EST LOW 20 MIN: CPT | Performed by: INTERNAL MEDICINE

## 2019-03-15 ASSESSMENT — ANXIETY QUESTIONNAIRES
3. WORRYING TOO MUCH ABOUT DIFFERENT THINGS: SEVERAL DAYS
1. FEELING NERVOUS, ANXIOUS, OR ON EDGE: NOT AT ALL
IF YOU CHECKED OFF ANY PROBLEMS ON THIS QUESTIONNAIRE, HOW DIFFICULT HAVE THESE PROBLEMS MADE IT FOR YOU TO DO YOUR WORK, TAKE CARE OF THINGS AT HOME, OR GET ALONG WITH OTHER PEOPLE: NOT DIFFICULT AT ALL
6. BECOMING EASILY ANNOYED OR IRRITABLE: NOT AT ALL
2. NOT BEING ABLE TO STOP OR CONTROL WORRYING: NOT AT ALL
GAD7 TOTAL SCORE: 1
5. BEING SO RESTLESS THAT IT IS HARD TO SIT STILL: NOT AT ALL
7. FEELING AFRAID AS IF SOMETHING AWFUL MIGHT HAPPEN: NOT AT ALL

## 2019-03-15 ASSESSMENT — PATIENT HEALTH QUESTIONNAIRE - PHQ9
SUM OF ALL RESPONSES TO PHQ QUESTIONS 1-9: 3
5. POOR APPETITE OR OVEREATING: NOT AT ALL

## 2019-03-15 ASSESSMENT — MIFFLIN-ST. JEOR: SCORE: 2140.71

## 2019-03-15 NOTE — PROGRESS NOTES
HPI and Plan:   See dictation    No orders of the defined types were placed in this encounter.      No orders of the defined types were placed in this encounter.      There are no discontinued medications.      Encounter Diagnosis   Name Primary?     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       ALLERGIES     Allergies   Allergen Reactions     Nkda [No Known Drug Allergies]        PAST MEDICAL HISTORY:  Past Medical History:   Diagnosis Date     Allergic rhinitis      Generalized anxiety disorder 10/29/2014     Diagnosis updated by automated process. Provider to review and confirm.     Heart rate problem 10/1/2015     Moderate major depression (H) 10/29/2014     PVC's (premature ventricular contractions)      Recurrent cholesteatoma of postmastoidectomy cavity        PAST SURGICAL HISTORY:  Past Surgical History:   Procedure Laterality Date     ABDOMEN SURGERY      intestial surgery as an infant     EP ABLATION PVC  03/08/2017    Ablation of RV outflow tract PVC     GI SURGERY  1978     MASTOIDECTOMY       TYMPANOPLASTY  3/1/1996       FAMILY HISTORY:  Family History   Problem Relation Age of Onset     Diabetes Father         type 2     Hypertension Father      Myocardial Infarction Father      Heart Disease Father      Heart Surgery Father 52        bypass     Arrhythmia Father      Obesity Father      Kidney Disease Mother      Myocardial Infarction Maternal Grandfather      Family History Negative Paternal Grandmother      Family History Negative Paternal Grandfather      Family History Negative Brother      Family History Negative Maternal Grandmother        SOCIAL HISTORY:  Social History     Socioeconomic History     Marital status:      Spouse name: None     Number of children: None     Years of education: None     Highest education level: None   Occupational History     Occupation: Sales -  Software.    Social Needs     Financial resource strain: None     Food insecurity:     Worry: None     Inability: None     Transportation needs:     Medical: None     Non-medical: None   Tobacco Use     Smoking status: Never Smoker     Smokeless tobacco: Never Used   Substance and Sexual Activity     Alcohol use: Yes     Comment: 1 glass wine, 2 days week      Drug use: No     Sexual activity: Yes     Partners: Female   Lifestyle     Physical activity:     Days per week: None     Minutes per session: None     Stress: None   Relationships     Social connections:     Talks on phone: None     Gets together: None     Attends Nondenominational service: None     Active member of club or organization: None     Attends meetings of clubs or organizations: None     Relationship status: None     Intimate partner violence:     Fear of current or ex partner: None     Emotionally abused: None     Physically abused: None     Forced sexual activity: None   Other Topics Concern     Parent/sibling w/ CABG, MI or angioplasty before 65F 55M? Yes     Comment: father      Service Not Asked     Blood Transfusions Not Asked     Caffeine Concern No     Comment: 1 cup per week     Occupational Exposure Not Asked     Hobby Hazards Not Asked     Sleep Concern No     Stress Concern Yes     Weight Concern No     Special Diet No     Back Care Not Asked     Exercise Yes     Comment: runs 1-2 days week     Bike Helmet Not Asked     Seat Belt Not Asked     Self-Exams Not Asked   Social History Narrative     None       Review of Systems:  Skin:  Negative       Eyes:  Negative      ENT:  Negative      Respiratory:  Positive for dyspnea on exertion     Cardiovascular:    palpitations;Positive for;fatigue episonde 1/19/19 while in [place] lasting about 2-3 minutes and one 1/20/2019 lasting about one minute  Gastroenterology: Negative      Genitourinary:  Negative      Musculoskeletal:  Negative      Neurologic:  Positive for headaches    Psychiatric:   "Negative      Heme/Lymph/Imm:  Negative      Endocrine:  Negative        Physical Exam:  Vitals: /81   Pulse 83   Ht 1.93 m (6' 3.98\")   Wt 112.9 kg (249 lb)   BMI 30.32 kg/m      Constitutional:           Skin:             Head:           Eyes:           Lymph:      ENT:           Neck:           Respiratory:            Cardiac:                                                           GI:           Extremities and Muscular Skeletal:                 Neurological:           Psych:           CC  Jose Cruz Lopez MD  [address]  [place] [address]              "

## 2019-03-15 NOTE — PROGRESS NOTES
Service Date: 03/15/2019      HISTORY OF PRESENT ILLNESS:  I saw Mr. Peterson for reevaluation of recurrent palpitations.  He is a 38-year-old white male with a history of persistent frequent symptomatic PVCs.  He underwent catheter ablation of PVCs on 2017.  The PVC was successfully ablated at the RV outflow tract.        He has been doing well for the last couple of years until January when he felt recurrent palpitations.  That occurred on several occasions and lasted for minutes with irregular heartbeats.  He saw Dr. Lopez on 2019.  He was then put on a cardiac event monitor which did not detect any PVCs.  He reported some shortness of breath once which was associated with sinus tachycardia.  He is now doing well with no complaints.      Today's physical examination showed no remarkable abnormalities.      ASSESSMENT AND RECOMMENDATIONS:  Mr. Peterson possibly had intermittent frequent PVCs when he was symptomatic in January.  The event monitor demonstrated that the previous PVC did not recur.  He had no PVCs detected on the event monitor.  At this point, I do not recommend further evaluation or treatment.  He will continue his normal activities and contact us for any questions or concerns.  He has been told that the ablation does not eliminate all PVCs.  It is possible that he may have intermittent frequent PVCs in the future.  As long as symptoms are not persistent, he should not be concerned.      cc:      BRINDA Harper, CNP   [address]   [place] [id number]         JANICE ENGLE MD             D: 03/15/2019   T: 03/15/2019   MT: SOTERO      Name:     PRITI PETERSON   MRN:      [id number]        Account:      [id number]   :      1980           Service Date: 03/15/2019      Document: [id number]

## 2019-03-15 NOTE — LETTER
3/15/2019    Yarely Gonsalves, APRN CNP  [address]  [facility] [address]    RE: Earnest Peterson       Dear Colleague,    I had the pleasure of seeing Earnest Peterson in the [facility].    HPI and Plan:   See dictation    No orders of the defined types were placed in this encounter.      No orders of the defined types were placed in this encounter.      There are no discontinued medications.      Encounter Diagnosis   Name Primary?     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       ALLERGIES     Allergies   Allergen Reactions     Nkda [No Known Drug Allergies]        PAST MEDICAL HISTORY:  Past Medical History:   Diagnosis Date     Allergic rhinitis      Generalized anxiety disorder 10/29/2014     Diagnosis updated by automated process. Provider to review and confirm.     Heart rate problem 10/1/2015     Moderate major depression (H) 10/29/2014     PVC's (premature ventricular contractions)      Recurrent cholesteatoma of postmastoidectomy cavity        PAST SURGICAL HISTORY:  Past Surgical History:   Procedure Laterality Date     ABDOMEN SURGERY      intestial surgery as an infant     EP ABLATION PVC  03/08/2017    Ablation of RV outflow tract PVC     GI SURGERY  1978     MASTOIDECTOMY       TYMPANOPLASTY  3/1/1996       FAMILY HISTORY:  Family History   Problem Relation Age of Onset     Diabetes Father         type 2     Hypertension Father      Myocardial Infarction Father      Heart Disease Father      Heart Surgery Father 52        bypass     Arrhythmia Father      Obesity Father      Kidney Disease Mother      Myocardial Infarction Maternal Grandfather      Family History Negative Paternal Grandmother      Family History Negative Paternal Grandfather      Family History Negative Brother      Family History Negative Maternal Grandmother        SOCIAL  HISTORY:  Social History     Socioeconomic History     Marital status:      Spouse name: None     Number of children: None     Years of education: None     Highest education level: None   Occupational History     Occupation: Sales - Software.    Social Needs     Financial resource strain: None     Food insecurity:     Worry: None     Inability: None     Transportation needs:     Medical: None     Non-medical: None   Tobacco Use     Smoking status: Never Smoker     Smokeless tobacco: Never Used   Substance and Sexual Activity     Alcohol use: Yes     Comment: 1 glass wine, 2 days week      Drug use: No     Sexual activity: Yes     Partners: Female   Lifestyle     Physical activity:     Days per week: None     Minutes per session: None     Stress: None   Relationships     Social connections:     Talks on phone: None     Gets together: None     Attends Yazidi service: None     Active member of club or organization: None     Attends meetings of clubs or organizations: None     Relationship status: None     Intimate partner violence:     Fear of current or ex partner: None     Emotionally abused: None     Physically abused: None     Forced sexual activity: None   Other Topics Concern     Parent/sibling w/ CABG, MI or angioplasty before 65F 55M? Yes     Comment: father      Service Not Asked     Blood Transfusions Not Asked     Caffeine Concern No     Comment: 1 cup per week     Occupational Exposure Not Asked     Hobby Hazards Not Asked     Sleep Concern No     Stress Concern Yes     Weight Concern No     Special Diet No     Back Care Not Asked     Exercise Yes     Comment: runs 1-2 days week     Bike Helmet Not Asked     Seat Belt Not Asked     Self-Exams Not Asked   Social History Narrative     None       Review of Systems:  Skin:  Negative       Eyes:  Negative      ENT:  Negative      Respiratory:  Positive for dyspnea on exertion     Cardiovascular:    palpitations;Positive for;fatigue episonde  "1/19/19 while in [place] lasting about 2-3 minutes and one 1/20/2019 lasting about one minute  Gastroenterology: Negative      Genitourinary:  Negative      Musculoskeletal:  Negative      Neurologic:  Positive for headaches    Psychiatric:  Negative      Heme/Lymph/Imm:  Negative      Endocrine:  Negative        Physical Exam:  Vitals: /81   Pulse 83   Ht 1.93 m (6' 3.98\")   Wt 112.9 kg (249 lb)   BMI 30.32 kg/m       Constitutional:           Skin:             Head:           Eyes:           Lymph:      ENT:           Neck:           Respiratory:            Cardiac:                                                           GI:           Extremities and Muscular Skeletal:                 Neurological:           Psych:           CC  Jose Cruz Lopez MD  [address]  [place] [address]                Thank you for allowing me to participate in the care of your patient.      Sincerely,     Hugo Castano MD     [facility]    cc:   Jose Cruz Lopez MD  [address]  [place] [address]        "

## 2019-03-15 NOTE — LETTER
3/15/2019      BRINDA Patton CNP  [address]  [facility] [address]      RE: Earnest Peterson       Dear Colleague,    I had the pleasure of seeing Earnest Peterson in the [facility].    Service Date: 03/15/2019      HISTORY OF PRESENT ILLNESS:  I saw Mr. Peterson for reevaluation of recurrent palpitations.  He is a 38-year-old white male with a history of persistent frequent symptomatic PVCs.  He underwent catheter ablation of PVCs on 2017.  The PVC was successfully ablated at the RV outflow tract.        He has been doing well for the last couple of years until January when he felt recurrent palpitations.  That occurred on several occasions and lasted for minutes with irregular heartbeats.  He saw Dr. Lopez on 2019.  He was then put on a cardiac event monitor which did not detect any PVCs.  He reported some shortness of breath once which was associated with sinus tachycardia.  He is now doing well with no complaints.      Today's physical examination showed no remarkable abnormalities.      ASSESSMENT AND RECOMMENDATIONS:  Mr. Peterson possibly had intermittent frequent PVCs when he was symptomatic in January.  The event monitor demonstrated that the previous PVC did not recur.  He had no PVCs detected on the event monitor.  At this point, I do not recommend further evaluation or treatment.  He will continue his normal activities and contact us for any questions or concerns.  He has been told that the ablation does not eliminate all PVCs.  It is possible that he may have intermittent frequent PVCs in the future.  As long as symptoms are not persistent, he should not be concerned.      cc:      BRINDA Harper, CNP   [facility]   [address]   [place] [id number]         JANICE ENGLE MD             D: 03/15/2019   T: 03/15/2019   MT: SOTERO      Name:     EARNEST PETERSON   MRN:      [id number]        Account:      [id number]   :       1980           Service Date: 03/15/2019      Document: [id number]           No outpatient encounter medications on file as of 3/15/2019.     No facility-administered encounter medications on file as of 3/15/2019.        Again, thank you for allowing me to participate in the care of your patient.      Sincerely,    Hugo Castano MD     [facility]

## 2019-03-16 ASSESSMENT — ANXIETY QUESTIONNAIRES: GAD7 TOTAL SCORE: 1

## 2019-03-19 ENCOUNTER — PRE VISIT (OUTPATIENT)
Dept: UROLOGY | Facility: CLINIC | Age: 39
End: 2019-03-19

## 2019-03-19 NOTE — TELEPHONE ENCOUNTER
MEDICAL RECORDS REQUEST   [facility]  Urology Clinic  [address] [place] [address]  PHONE: [phone]  Fax: [phone]        FUTURE VISIT INFORMATION                                                   Earnest Peterson, : 1980 scheduled for future visit at [facility]    APPOINTMENT INFORMATION:     Date: 2019    Provider:  CHINMAY LAIRD    Reason for Visit/Diagnosis: VASECTOMY CONSULT    REFERRAL INFORMATION:    Referring provider:  SELF    Specialty: SELF    Referring providers clinic:  SELF    Clinic contact number:  SELF    RECORDS REQUESTED FOR VISIT                                                     NOTES  STATUS/DETAILS   OFFICE NOTE from referring provider  no   OFFICE NOTE from other specialist  no   DISCHARGE SUMMARY from hospital  no   DISCHARGE REPORT from the ER  no   OPERATIVE REPORT  no   MEDICATION LIST  yes       PRE-VISIT CHECKLIST      Record collection complete Yes   Appointment appropriately scheduled           (right time/right provider) Yes   MyChart activation Yes   Questionnaire complete If no, please explain IN PROCESS     Completed by: Leslie Jimenez

## 2019-03-20 NOTE — RESULT ENCOUNTER NOTE
Earnest,    It was nice to see you last week.  Congrats again on your promotion.  Your vitamin D is low normal.  I am typically looking for people to be between 50-75 and your level was 35.  You should be able to get to the desired level with 2000 international unit(s) daily of vitamin D.  If you are interested in seeing if it increased your level, I'd have you recheck eight weeks after starting the supplement, but it is not completely necessary.  Your cholesterol looks great and your cardiovascular risk score is very low.  Thyroid is normal and glucose is normal. If you have any questions, please feel free to contact the clinic.    EBENEZER Escobedo    The 10-year ASCVD risk score (Lisbonwolfgang GASPAR Jr., et al., 2013) is: 0.9%    Values used to calculate the score:      Age: 38 years      Sex: Male      Is Non- : No      Diabetic: No      Tobacco smoker: No      Systolic Blood Pressure: 122 mmHg      Is BP treated: No      HDL Cholesterol: 47 mg/dL      Total Cholesterol: 179 mg/dL

## 2019-04-16 ENCOUNTER — PRE VISIT (OUTPATIENT)
Dept: UROLOGY | Facility: CLINIC | Age: 39
End: 2019-04-16

## 2019-04-16 NOTE — TELEPHONE ENCOUNTER
Patient coming in for vasectomy consult with Dr. Ford. Patient chart reviewed, no need for call, all records available and ready for appointment.

## 2019-04-25 ENCOUNTER — OFFICE VISIT (OUTPATIENT)
Dept: UROLOGY | Facility: CLINIC | Age: 39
End: 2019-04-25
Attending: NURSE PRACTITIONER
Payer: COMMERCIAL

## 2019-04-25 VITALS
WEIGHT: 244.7 LBS | HEART RATE: 65 BPM | DIASTOLIC BLOOD PRESSURE: 78 MMHG | HEIGHT: 76 IN | SYSTOLIC BLOOD PRESSURE: 116 MMHG | BODY MASS INDEX: 29.8 KG/M2

## 2019-04-25 DIAGNOSIS — Z30.09 ENCOUNTER FOR VASECTOMY COUNSELING: Primary | ICD-10-CM

## 2019-04-25 RX ORDER — MOMETASONE FUROATE MONOHYDRATE 50 UG/1
2 SPRAY, METERED NASAL DAILY
COMMUNITY

## 2019-04-25 ASSESSMENT — PAIN SCALES - GENERAL: PAINLEVEL: NO PAIN (0)

## 2019-04-25 ASSESSMENT — MIFFLIN-ST. JEOR: SCORE: 2121.45

## 2019-04-25 NOTE — PATIENT INSTRUCTIONS
Please make a appointment for a vasectomy    It was a pleasure meeting with you today.  Thank you for allowing me and my team the privilege of caring for you today.  YOU are the reason we are here, and I truly hope we provided you with the excellent service you deserve.  Please let us know if there is anything else we can do for you so that we can be sure you are leaving completely satisfied with your care experience.

## 2019-04-25 NOTE — NURSING NOTE
"Chief Complaint   Patient presents with     Consult     vasectomy consult       Blood pressure 116/78, pulse 65, height 1.93 m (6' 4\"), weight 111 kg (244 lb 11.2 oz). Body mass index is 29.79 kg/m .    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

## 2019-04-25 NOTE — PROGRESS NOTES
CC: Desires sterilization, consult for vasectomy from Dr. Yarely Gonsalves     HPI: Earnest Peterson is a 38 year old male with 2 children, and he is intersted in getting a vasectomy for sterilization.      No voiding problems.  No history of  trauma.  No hematuria  Normal sexual function.  No history of bleeding problems.    PMH:   Past Medical History:   Diagnosis Date     Allergic rhinitis      Generalized anxiety disorder 10/29/2014     Diagnosis updated by automated process. Provider to review and confirm.     Heart rate problem 10/1/2015     Moderate major depression (H) 10/29/2014     PVC's (premature ventricular contractions)      Recurrent cholesteatoma of postmastoidectomy cavity      Surgical Hx  Past Surgical History:   Procedure Laterality Date     ABDOMEN SURGERY      intestial surgery as an infant     EP ABLATION PVC  03/08/2017    Ablation of RV outflow tract PVC     GI SURGERY  1978     MASTOIDECTOMY       TYMPANOPLASTY  3/1/1996         FAMILY HX:   Family History   Problem Relation Age of Onset     Diabetes Father         type 2     Hypertension Father      Myocardial Infarction Father      Heart Disease Father      Heart Surgery Father 52        bypass     Arrhythmia Father      Obesity Father      Kidney Disease Mother      Myocardial Infarction Maternal Grandfather      Family History Negative Paternal Grandmother      Family History Negative Paternal Grandfather      Family History Negative Brother      Family History Negative Maternal Grandmother       No history of coagulopathies, no  malignancies.     ALLERGIES:      Allergies   Allergen Reactions     Nkda [No Known Drug Allergies]        MEDS:   Current Outpatient Medications   Medication Sig     mometasone (NASONEX) 50 MCG/ACT nasal spray Spray 2 sprays into both nostrils daily     No current facility-administered medications for this visit.    No prescription medications at this time.    SOCIAL HISTORY:  Social History     Tobacco  "Use     Smoking status: Never Smoker     Smokeless tobacco: Never Used   Substance Use Topics     Alcohol use: Yes     Comment: 1 glass wine, 2 days week      Drug use: Never        REVIEW OF SYMPTOMS:   Denies testicular pain, ED, ejaculatory problems, rashes in the groin, easy bruising or bleeding.   No constitutional, eye, ENT, heart, lung, GI, musculoskeletal, skin, neurologic, psychiatric, endocrine or hematologic complaints.     GENERAL PHYSICAL EXAM:   /78   Pulse 65   Ht 1.93 m (6' 4\")   Wt 111 kg (244 lb 11.2 oz)   BMI 29.79 kg/m     Constitutional: No acute distress. Well nourished.   PSYCH: Normal mood and affect.   NEURO: Normal gait, no focal deficits.   EYES: Anicteric, EOMI, PERR  CARDIOPULMONARY: Breathing non-labored, pulse regular, no peripheral edema.   GI: Abdomen soft, non-tender, surgical scars: none noted.  MUSCULOSKELETAL: Normal limb proportions, no muscle wasting, no contractures.    SKIN: Normal virilized hair distribution, no lesions, warts or rashes over genitalia, abdomen extremities or face.   HEME/LYMPH: No echymosis, no lymphadenopathy in groin, no lymphedema.     EXAM:  Phallus circumcised, meatus adequate, no plaques palpated.   Testes descended, consistency is normal. No intra-testicular masses.  Epididymes present.  Vas present bilateraly, both very easy to find.  TRINY deferred.     I discussed with him at length the risks and benefits of the procedure. He understands that this is a sterilization procedure, and not reversible contraception. He understands that reversals, while possible, are not guaranteed to work and fairly complex. I discussed with him the option of sperm cryopreservation.     I stressed that he continues to be fertile in the post-operative period, and that he should continue using other contraceptive methods, such as a condom, until he obtains a semen analysis and we review the results to confirm success of the procedure and infertility. I also " stressed to him that recanalization and pregnancy can occur in about 1 per thousand cases, possibly more even after we clear him with a semen analysis showing no motile sperm. I counseled him on the efe-operative risks of bleeding, infection, pain.  I described to him post-vasectomy pain syndrome that can occur in about 1 to 2% of men undergoing vasectomy.     We also discussed recovery times (typically days if no complications) and post-operative care including use of ice packs, pain medication and wound care.    He will think about the above and schedule the procedure if he decides to proceed.

## 2019-04-25 NOTE — LETTER
RE: Earnest Peterson  [address]  [facility] [address]     Dear Colleague,    Thank you for referring your patient, Earnest Peterson, to the [facility] at [facility]. Please see a copy of my visit note below.    CC: Desires sterilization, consult for vasectomy from Dr. Yarely Gonsalves     HPI: Earnest Peterson is a 38 year old male with 2 children, and he is intersted in getting a vasectomy for sterilization.      No voiding problems.  No history of  trauma.  No hematuria  Normal sexual function.  No history of bleeding problems.    PMH:   Past Medical History:   Diagnosis Date     Allergic rhinitis      Generalized anxiety disorder 10/29/2014     Diagnosis updated by automated process. Provider to review and confirm.     Heart rate problem 10/1/2015     Moderate major depression (H) 10/29/2014     PVC's (premature ventricular contractions)      Recurrent cholesteatoma of postmastoidectomy cavity      Surgical Hx  Past Surgical History:   Procedure Laterality Date     ABDOMEN SURGERY      intestial surgery as an infant     EP ABLATION PVC  03/08/2017    Ablation of RV outflow tract PVC     GI SURGERY  1978     MASTOIDECTOMY       TYMPANOPLASTY  3/1/1996         FAMILY HX:   Family History   Problem Relation Age of Onset     Diabetes Father         type 2     Hypertension Father      Myocardial Infarction Father      Heart Disease Father      Heart Surgery Father 52        bypass     Arrhythmia Father      Obesity Father      Kidney Disease Mother      Myocardial Infarction Maternal Grandfather      Family History Negative Paternal Grandmother      Family History Negative Paternal Grandfather      Family History Negative Brother      Family History Negative Maternal Grandmother       No history of coagulopathies, no  malignancies.     ALLERGIES:      Allergies   Allergen Reactions     Nkda [No Known Drug Allergies]   "      MEDS:   Current Outpatient Medications   Medication Sig     mometasone (NASONEX) 50 MCG/ACT nasal spray Spray 2 sprays into both nostrils daily     No current facility-administered medications for this visit.    No prescription medications at this time.    SOCIAL HISTORY:  Social History     Tobacco Use     Smoking status: Never Smoker     Smokeless tobacco: Never Used   Substance Use Topics     Alcohol use: Yes     Comment: 1 glass wine, 2 days week      Drug use: Never        REVIEW OF SYMPTOMS:   Denies testicular pain, ED, ejaculatory problems, rashes in the groin, easy bruising or bleeding.   No constitutional, eye, ENT, heart, lung, GI, musculoskeletal, skin, neurologic, psychiatric, endocrine or hematologic complaints.     GENERAL PHYSICAL EXAM:   /78   Pulse 65   Ht 1.93 m (6' 4\")   Wt 111 kg (244 lb 11.2 oz)   BMI 29.79 kg/m      Constitutional: No acute distress. Well nourished.   PSYCH: Normal mood and affect.   NEURO: Normal gait, no focal deficits.   EYES: Anicteric, EOMI, PERR  CARDIOPULMONARY: Breathing non-labored, pulse regular, no peripheral edema.   GI: Abdomen soft, non-tender, surgical scars: none noted.  MUSCULOSKELETAL: Normal limb proportions, no muscle wasting, no contractures.    SKIN: Normal virilized hair distribution, no lesions, warts or rashes over genitalia, abdomen extremities or face.   HEME/LYMPH: No echymosis, no lymphadenopathy in groin, no lymphedema.     EXAM:  Phallus circumcised, meatus adequate, no plaques palpated.   Testes descended, consistency is normal. No intra-testicular masses.  Epididymes present.  Vas present bilateraly, both very easy to find.  TRINY deferred.     I discussed with him at length the risks and benefits of the procedure. He understands that this is a sterilization procedure, and not reversible contraception. He understands that reversals, while possible, are not guaranteed to work and fairly complex. I discussed with him the option of " sperm cryopreservation.     I stressed that he continues to be fertile in the post-operative period, and that he should continue using other contraceptive methods, such as a condom, until he obtains a semen analysis and we review the results to confirm success of the procedure and infertility. I also stressed to him that recanalization and pregnancy can occur in about 1 per thousand cases, possibly more even after we clear him with a semen analysis showing no motile sperm. I counseled him on the efe-operative risks of bleeding, infection, pain.  I described to him post-vasectomy pain syndrome that can occur in about 1 to 2% of men undergoing vasectomy.     We also discussed recovery times (typically days if no complications) and post-operative care including use of ice packs, pain medication and wound care.    He will think about the above and schedule the procedure if he decides to proceed.    Again, thank you for allowing me to participate in the care of your patient.      Sincerely,    Jeison Ford MD

## 2019-06-11 ENCOUNTER — MYC MEDICAL ADVICE (OUTPATIENT)
Dept: UROLOGY | Facility: CLINIC | Age: 39
End: 2019-06-11

## 2019-06-20 ENCOUNTER — OFFICE VISIT (OUTPATIENT)
Dept: UROLOGY | Facility: CLINIC | Age: 39
End: 2019-06-20
Payer: COMMERCIAL

## 2019-06-20 VITALS
SYSTOLIC BLOOD PRESSURE: 110 MMHG | HEIGHT: 76 IN | BODY MASS INDEX: 29.71 KG/M2 | WEIGHT: 244 LBS | DIASTOLIC BLOOD PRESSURE: 78 MMHG | HEART RATE: 68 BPM

## 2019-06-20 DIAGNOSIS — Z30.2 ENCOUNTER FOR VASECTOMY: Primary | ICD-10-CM

## 2019-06-20 RX ORDER — LIDOCAINE HYDROCHLORIDE 20 MG/ML
100 INJECTION, SOLUTION EPIDURAL; INFILTRATION; INTRACAUDAL; PERINEURAL ONCE
Status: COMPLETED | OUTPATIENT
Start: 2019-06-20 | End: 2019-06-20

## 2019-06-20 RX ADMIN — LIDOCAINE HYDROCHLORIDE 100 MG: 20 INJECTION, SOLUTION EPIDURAL; INFILTRATION; INTRACAUDAL; PERINEURAL at 10:25

## 2019-06-20 ASSESSMENT — MIFFLIN-ST. JEOR: SCORE: 2113.28

## 2019-06-20 ASSESSMENT — PAIN SCALES - GENERAL
PAINLEVEL: NO PAIN (0)
PAINLEVEL: NO PAIN (0)

## 2019-06-20 NOTE — PROGRESS NOTES
Procedure: Vasectomy bilateral.   Anesthesia: Local lidocaine injection by surgeon.  Surgeon: RANJANA Ford M.D.   Assistant:  MAXIME Simmons    Description of procedure: After the patient received education material regarding vasectomy, including risks and benefits, we proceeded with obtaining consent and proceeded with the surgery. He understands that there is a risk of late failure from recanalization. He understands that he is fertile until he has completed one or more semen analyses and he is given clearance from us for unprotected intercourse.  He understands that we will contact regarding the results but it is his responsibility to make sure he is cleared before having unprotected intercourse.  I have discussed with him other risks including bleeding, infection, acute and possible long-term pain.    The right vas was ligated first.  This was done using the standard technique by grasping it with a three-finger  and lifting it up to the skin.  A small amount of lidocaine was infiltrated into the skin and vasal sheath.  The skin was punctured and dilated bluntly using the scalpel-less dissector.  The sheath was identified and a rigid clamp was passed around the vas which was then lifted out of the incision.  The vas was cleaned off from the deferential vessels and the sheath, and cautery was used to divide the vas between mosquitos.  A small segment was removed and discarded.  The lumen of the vas was cannulated to confirm its identity, and the lumens of both ends were cauterized.  Pen cautery was used sparingly/as needed for minor bleeders.  A facial interposition was then performed with a single suture of 4-0 chromic. The skin defect was closed with a 4-0 chromic interrupted suture after confirming adequate hemostasis.       We then turned our attention to the left side and a similar technique was performed. This involved division, excision of a segment, cautery of the lumens, and fascial interposition.    There  were no hematomas noted at the end of the procedure.  The patient tolerated the procedure well.    He was advised to return for a post vasectomy semen check in 3 months, and that he is not sterile and must continue to use contraception until we tell him otherwise (based on follow-up semen specimen(s)).    Plan:  -Post vasectomy semen analysis in 3 months   -ice packs to scrotum X 24h  -keep incision dry X 48h  -Rx for  Tylenol #3    Jessica HOGUE

## 2019-06-20 NOTE — PATIENT INSTRUCTIONS
What Can I Expect After My Vasectomy?      Your scrotum may be swollen and bruised. We suggest you:  - Raise the area and apply ice packs (or frozen vegetables). Use the ice packs for 20 minutes on and 20 minutes off for 24 to 36 hours.  - Wear a jock strap or tight briefs for support for a week.  - Limit your activity for the first 24 to 36 hours. Wait up to 48 hours, if you feel the need. No heavy lifting for 1 week.      You will be offered a prescription for Tylenol 3 (acetaminophen with codeine). You may take ibuprofen with this medication, but do not double up with acetaminophen while taking Tylenol 3.      You should be able to return to work the next day, unless you do heavy physical work.      You can safely ejaculate (have a sexual climax) in about one week, or when it feels comfortable.    Risk of pregnancy    After your vasectomy, you can still get your partner pregnant for three months or more. Use extra birth control, such as condoms, until tests show that you are sterile (no live sperm).    Vasectomy is not 100 percent reliable. Pregnancy may occur for about 1 out of 2000 men even after testing shows zero sperm count.    Can a vasectomy be reversed?    Vasectomy is meant to be birth control that lasts a lifetime. If you change your mind, we can try to reverse it with surgery. But this will not be successful in every case.    Sperm count test    You will have a sperm-count test after the vasectomy. You should have had at least 20 ejaculations (discharges of semen) since your surgery. It will be a few months before you are sterile. Ten to twelve weeks after your surgery, schedule a sperm count test. Call [phone] to schedule. We will call you in 2 weeks with the results.    If you have any questions, please contact us:    [facility] (nurse line): [phone]

## 2019-06-20 NOTE — NURSING NOTE
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)     Cardiomyopathy, nonischemic (H)       Allergies   Allergen Reactions     Nkda [No Known Drug Allergies]        Current Outpatient Medications   Medication Sig Dispense Refill     mometasone (NASONEX) 50 MCG/ACT nasal spray Spray 2 sprays into both nostrils daily         Social History     Tobacco Use     Smoking status: Never Smoker     Smokeless tobacco: Never Used   Substance Use Topics     Alcohol use: Yes     Comment: 1 glass wine, 2 days week      Drug use: Never       Invasive Procedure Safety Checklist:    Procedure: vasectomy    Action: Complete sections and checkboxes as appropriate.    Pre-procedure:  1. Patient ID Verified with 2 identifiers ([place] and  or MRN) : YES    2. Procedure and site verified with patient/designee (when able) : YES    3. Accurate consent documentation in medical record : YES    4. H&P (or appropriate assessment) documented in medical record : N/A  H&P must be up to 30 days prior to procedure an updated within 24 hours of                 Procedure as applicable.     5. Relevant diagnostic and radiology test results appropriately labeled and displayed as applicable : YES    6. Blood products, implants, devices, and/or special equipment available for the procedure as applicable : YES    7. Procedure site(s) marked with provider initials [Exclusions: none] : NO    8. Marking not required. Reason : Yes  Procedure does not require site marking    Time Out:     Time-Out performed immediately prior to starting procedure, including verbal and active participation of all team members addressing: YES    1. Correct patient identity.  2. Confirmed that the correct side and site are marked.  3. An accurate procedure to  be done.  4. Agreement on the procedure to be done.  5. Correct patient position.  6. Relevant images and results are properly labeled and appropriately displayed.  7. The need to administer antibiotics or fluids for irrigation purposes during the procedure as applicable.  8. Safety precautions based on patient history or medication use.    During Procedure: Verification of correct person, site, and procedure occurs any time the responsibility for care of the patient is transferred to another member of the care team.    The following medication was given:     MEDICATION:  Lidocaine without epinephrine 2%  ROUTE: administered by physician - scrotal  SITE: administered by physician - scrotal  DOSE: 5 mL  LOT #: [id number]  : [employer]  EXPIRATION DATE: 11/2022  NDC#: [address]-54   Was there drug waste? Yes  Amount of drug waste (mL): 15 mL (20.  Reason for waste:  Single use vial    Prior to injection, verified patient identity using patient's name and date of birth.  Due to injection administration, patient instructed to remain in clinic for 15 minutes  afterwards, and to report any adverse reaction to me immediately.    Drug Amount Wasted:  Yes: 15 mL (20 mg/ml )  Vial/Syringe: Single dose vial      Linnea Simmons LPN  6/20/2019  9:40 AM

## 2019-06-20 NOTE — LETTER
6/20/2019       RE: Earnest Peterson  [address]  [facility] [address]     Dear Colleague,    Thank you for referring your patient, Earnest Peterson, to the [facility] at [facility]. Please see a copy of my visit note below.    Procedure: Vasectomy bilateral.   Anesthesia: Local lidocaine injection by surgeon.  Surgeon: RANJANA Ford M.D.   Assistant:  MAXIME Simmons    Description of procedure: After the patient received education material regarding vasectomy, including risks and benefits, we proceeded with obtaining consent and proceeded with the surgery. He understands that there is a risk of late failure from recanalization. He understands that he is fertile until he has completed one or more semen analyses and he is given clearance from us for unprotected intercourse.  He understands that we will contact regarding the results but it is his responsibility to make sure he is cleared before having unprotected intercourse.  I have discussed with him other risks including bleeding, infection, acute and possible long-term pain.    The right vas was ligated first.  This was done using the standard technique by grasping it with a three-finger  and lifting it up to the skin.  A small amount of lidocaine was infiltrated into the skin and vasal sheath.  The skin was punctured and dilated bluntly using the scalpel-less dissector.  The sheath was identified and a rigid clamp was passed around the vas which was then lifted out of the incision.  The vas was cleaned off from the deferential vessels and the sheath, and cautery was used to divide the vas between mosquitos.  A small segment was removed and discarded.  The lumen of the vas was cannulated to confirm its identity, and the lumens of both ends were cauterized.  Pen cautery was used sparingly/as needed for minor bleeders.  A facial interposition was then performed with a single suture  of 4-0 chromic. The skin defect was closed with a 4-0 chromic interrupted suture after confirming adequate hemostasis.       We then turned our attention to the left side and a similar technique was performed. This involved division, excision of a segment, cautery of the lumens, and fascial interposition.    There were no hematomas noted at the end of the procedure.  The patient tolerated the procedure well.    He was advised to return for a post vasectomy semen check in 3 months, and that he is not sterile and must continue to use contraception until we tell him otherwise (based on follow-up semen specimen(s)).    Plan:  -Post vasectomy semen analysis in 3 months   -ice packs to scrotum X 24h  -keep incision dry X 48h  -Rx for  Tylenol #3    Jessica HOGUE

## 2019-09-27 DIAGNOSIS — Z30.2 ENCOUNTER FOR VASECTOMY: ICD-10-CM

## 2019-09-27 LAB
ABSTINENCE DAYS: 3 DAYS (ref 2–7)
AGGLUTINATION: NORMAL YES/NO
ANALYSIS TEMP - CENTIGRADE: 22 CENTIGRADE
CELL FRAGMENTS: NORMAL %
COLLECTION METHOD: NORMAL
COLLECTION SITE: NORMAL
CONSENT TO RELEASE TO PARTNER: YES
IMMATURE SPERM: NORMAL %
IMMOTILE: 0 %
LAB RECEIPT TIME: NORMAL
LIQUEFIED: YES YES/NO
NON-PROGRESSIVE MOTILITY: 0 %
PROGRESSIVE MOTILITY: 0 % (ref 32–?)
ROUND CELLS: 0 MILLION/ML (ref ?–2)
SPECIMEN CONCENTRATION: 0 MILLION/ML (ref 15–?)
SPECIMEN PH: 7.6 PH (ref 7.2–?)
SPECIMEN TYPE: NORMAL
SPECIMEN VOL UR: 2 ML (ref 1.5–?)
TIME OF ANALYSIS: NORMAL
TOTAL NUMBER: 0 MILLION (ref 39–?)
TOTAL PROGRESSIVE MOTILE: 0 MILLION (ref 15.6–?)
VISCOUS: NO YES/NO
WBC SPECIMEN: NORMAL %

## 2019-09-27 PROCEDURE — 89321 SEMEN ANAL SPERM DETECTION: CPT

## 2019-09-29 NOTE — RESULT ENCOUNTER NOTE
Dear Earnest,     You are cleared for intercourse post-vasectomy.    Your semen analysis showed zero sperm.    Thank You!   Let me know if you have any questions.    Jessica HOGUE

## 2019-10-07 ENCOUNTER — TRANSFERRED RECORDS (OUTPATIENT)
Dept: HEALTH INFORMATION MANAGEMENT | Facility: CLINIC | Age: 39
End: 2019-10-07

## 2019-11-07 ENCOUNTER — HEALTH MAINTENANCE LETTER (OUTPATIENT)
Age: 39
End: 2019-11-07

## 2020-07-01 ENCOUNTER — OFFICE VISIT (OUTPATIENT)
Dept: URGENT CARE | Facility: URGENT CARE | Age: 40
End: 2020-07-01
Payer: COMMERCIAL

## 2020-07-01 ENCOUNTER — ANCILLARY PROCEDURE (OUTPATIENT)
Dept: GENERAL RADIOLOGY | Facility: CLINIC | Age: 40
End: 2020-07-01
Attending: PHYSICIAN ASSISTANT
Payer: COMMERCIAL

## 2020-07-01 VITALS
HEART RATE: 78 BPM | BODY MASS INDEX: 30.07 KG/M2 | OXYGEN SATURATION: 98 % | DIASTOLIC BLOOD PRESSURE: 83 MMHG | TEMPERATURE: 97.8 F | SYSTOLIC BLOOD PRESSURE: 134 MMHG | WEIGHT: 247 LBS

## 2020-07-01 DIAGNOSIS — T14.8XXA MUSCLE STRAIN: ICD-10-CM

## 2020-07-01 DIAGNOSIS — R07.81 RIB PAIN: Primary | ICD-10-CM

## 2020-07-01 PROCEDURE — 71110 X-RAY EXAM RIBS BIL 3 VIEWS: CPT | Mod: 52

## 2020-07-01 PROCEDURE — 99214 OFFICE O/P EST MOD 30 MIN: CPT | Performed by: PHYSICIAN ASSISTANT

## 2020-07-01 RX ORDER — CYCLOBENZAPRINE HCL 10 MG
10 TABLET ORAL 3 TIMES DAILY PRN
Qty: 21 TABLET | Refills: 0 | Status: SHIPPED | OUTPATIENT
Start: 2020-07-01 | End: 2020-07-08

## 2020-07-01 RX ORDER — IBUPROFEN 600 MG/1
600 TABLET, FILM COATED ORAL EVERY 6 HOURS PRN
Qty: 28 TABLET | Refills: 0 | Status: SHIPPED | OUTPATIENT
Start: 2020-07-01 | End: 2020-07-08

## 2020-07-01 ASSESSMENT — ENCOUNTER SYMPTOMS
NEUROLOGICAL NEGATIVE: 1
PSYCHIATRIC NEGATIVE: 1
CONSTITUTIONAL NEGATIVE: 1
PALPITATIONS: 0
GASTROINTESTINAL NEGATIVE: 1

## 2020-07-02 NOTE — PATIENT INSTRUCTIONS
Patient Education     Noncardiac Chest Pain    Based on your visit today, the healthcare provider doesn t know what is causing your chest pain. In most cases, people who come to the emergency department with chest pain don t have a problem with their heart. Instead, the pain is caused by other conditions. It's important for the healthcare team to be sure you are not having a life threatening cause for chest pain such as a heart attack, blood clot in the lungs, collapsed lung, ruptured esophagus, or tearing of the aorta. Once these major causes have been ruled out, you may have further evaluation for non-heart causes of chest pain. These may be problems with the lungs, muscles, bones, digestive tract, nerves, or mental health.  Lung problems    Inflammation around the lungs (pleurisy)    Collapsed lung (pneumothorax)    Fluid around the lungs (pleural effusion)    Lung cancer (a rare cause of chest pain)  Muscle or bone problems    Inflamed cartilage between the ribs (costochondritis)    Fibromyalgia    Rheumatoid arthritis    Chest wall strain  Digestive system problems    Reflux    Stomach ulcer    Spasms of the esophagus    Gall stones    Gallbladder inflammation  Mental health conditions    Panic or anxiety attacks    Emotional distress  Your condition doesn t seem serious and your pain doesn t appear to be coming from your heart. But sometimes the signs of a serious problem take more time to appear. Watch for the warning signs listed below.  Home care  Follow these guidelines when caring for yourself at home:    Rest today and avoid strenuous activity.    Take any prescribed medicine as directed.  Follow-up care  Follow up with your healthcare provider, or as advised, if you don t start to feel better within 24 hours.  When to seek medical advice  Call your healthcare provider right away if any of these occur:    A change in the type of pain. Call if it feels different, becomes more serious, lasts longer, or  begins to spread into your shoulder, arm, neck, jaw, or back.    Shortness of breath    You feel more pain when you breathe    Cough with dark-colored mucus or blood    Weakness, dizziness, or fainting    Fever of 100.4 F (38 C) or higher, or as directed by your healthcare provider    Swelling, pain, or redness in one leg  Date Last Reviewed: 12/1/2016 2000-2019 [employer] [address] [place] [address], [place] [id number]. All rights reserved. This information is not intended as a substitute for professional medical care. Always follow your healthcare professional's instructions.
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
## 2020-10-09 ENCOUNTER — TRANSFERRED RECORDS (OUTPATIENT)
Dept: HEALTH INFORMATION MANAGEMENT | Facility: CLINIC | Age: 40
End: 2020-10-09

## 2020-11-29 ENCOUNTER — HEALTH MAINTENANCE LETTER (OUTPATIENT)
Age: 40
End: 2020-11-29

## 2021-04-09 ENCOUNTER — IMMUNIZATION (OUTPATIENT)
Dept: NURSING | Facility: CLINIC | Age: 41
End: 2021-04-09
Payer: COMMERCIAL

## 2021-04-09 PROCEDURE — 91300 PR COVID VAC PFIZER DIL RECON 30 MCG/0.3 ML IM: CPT

## 2021-04-09 PROCEDURE — 0001A PR COVID VAC PFIZER DIL RECON 30 MCG/0.3 ML IM: CPT

## 2021-04-30 ENCOUNTER — IMMUNIZATION (OUTPATIENT)
Dept: NURSING | Facility: CLINIC | Age: 41
End: 2021-04-30
Attending: INTERNAL MEDICINE
Payer: COMMERCIAL

## 2021-04-30 PROCEDURE — 91300 PR COVID VAC PFIZER DIL RECON 30 MCG/0.3 ML IM: CPT

## 2021-04-30 PROCEDURE — 0002A PR COVID VAC PFIZER DIL RECON 30 MCG/0.3 ML IM: CPT

## 2021-09-25 ENCOUNTER — HEALTH MAINTENANCE LETTER (OUTPATIENT)
Age: 41
End: 2021-09-25

## 2021-11-06 ENCOUNTER — TRANSFERRED RECORDS (OUTPATIENT)
Dept: HEALTH INFORMATION MANAGEMENT | Facility: CLINIC | Age: 41
End: 2021-11-06
Payer: COMMERCIAL

## 2021-11-22 ASSESSMENT — ENCOUNTER SYMPTOMS
PARESTHESIAS: 0
WEAKNESS: 1
CHILLS: 0
DIARRHEA: 0
HEADACHES: 0
ARTHRALGIAS: 1
HEMATOCHEZIA: 0
CONSTIPATION: 0
HEMATURIA: 0
NERVOUS/ANXIOUS: 0
FREQUENCY: 0
SORE THROAT: 0
EYE PAIN: 0
COUGH: 0
DYSURIA: 0
NAUSEA: 0
JOINT SWELLING: 0
HEARTBURN: 0
MYALGIAS: 1
SHORTNESS OF BREATH: 1
FEVER: 0
DIZZINESS: 0
PALPITATIONS: 0
ABDOMINAL PAIN: 0

## 2021-11-23 ENCOUNTER — OFFICE VISIT (OUTPATIENT)
Dept: FAMILY MEDICINE | Facility: CLINIC | Age: 41
End: 2021-11-23
Payer: COMMERCIAL

## 2021-11-23 VITALS
SYSTOLIC BLOOD PRESSURE: 124 MMHG | OXYGEN SATURATION: 98 % | BODY MASS INDEX: 30.37 KG/M2 | HEIGHT: 75 IN | TEMPERATURE: 97.1 F | HEART RATE: 75 BPM | DIASTOLIC BLOOD PRESSURE: 88 MMHG | WEIGHT: 244.3 LBS

## 2021-11-23 DIAGNOSIS — Z23 HIGH PRIORITY FOR 2019-NCOV VACCINE: ICD-10-CM

## 2021-11-23 DIAGNOSIS — Z13.6 CARDIOVASCULAR SCREENING; LDL GOAL LESS THAN 160: ICD-10-CM

## 2021-11-23 DIAGNOSIS — Z00.00 ROUTINE GENERAL MEDICAL EXAMINATION AT A HEALTH CARE FACILITY: Primary | ICD-10-CM

## 2021-11-23 DIAGNOSIS — M25.512 ACUTE PAIN OF LEFT SHOULDER: ICD-10-CM

## 2021-11-23 LAB
BASOPHILS # BLD AUTO: 0 10E3/UL (ref 0–0.2)
BASOPHILS NFR BLD AUTO: 0 %
EOSINOPHIL # BLD AUTO: 0.1 10E3/UL (ref 0–0.7)
EOSINOPHIL NFR BLD AUTO: 1 %
ERYTHROCYTE [DISTWIDTH] IN BLOOD BY AUTOMATED COUNT: 13.6 % (ref 10–15)
HCT VFR BLD AUTO: 45.3 % (ref 40–53)
HGB BLD-MCNC: 15.1 G/DL (ref 13.3–17.7)
LYMPHOCYTES # BLD AUTO: 1.7 10E3/UL (ref 0.8–5.3)
LYMPHOCYTES NFR BLD AUTO: 23 %
MCH RBC QN AUTO: 27 PG (ref 26.5–33)
MCHC RBC AUTO-ENTMCNC: 33.3 G/DL (ref 31.5–36.5)
MCV RBC AUTO: 81 FL (ref 78–100)
MONOCYTES # BLD AUTO: 0.8 10E3/UL (ref 0–1.3)
MONOCYTES NFR BLD AUTO: 10 %
NEUTROPHILS # BLD AUTO: 4.9 10E3/UL (ref 1.6–8.3)
NEUTROPHILS NFR BLD AUTO: 66 %
PLATELET # BLD AUTO: 228 10E3/UL (ref 150–450)
RBC # BLD AUTO: 5.59 10E6/UL (ref 4.4–5.9)
WBC # BLD AUTO: 7.4 10E3/UL (ref 4–11)

## 2021-11-23 PROCEDURE — 85025 COMPLETE CBC W/AUTO DIFF WBC: CPT | Performed by: PHYSICIAN ASSISTANT

## 2021-11-23 PROCEDURE — 0004A COVID-19,PF,PFIZER (12+ YRS): CPT | Performed by: PHYSICIAN ASSISTANT

## 2021-11-23 PROCEDURE — 80053 COMPREHEN METABOLIC PANEL: CPT | Performed by: PHYSICIAN ASSISTANT

## 2021-11-23 PROCEDURE — 83721 ASSAY OF BLOOD LIPOPROTEIN: CPT | Performed by: PHYSICIAN ASSISTANT

## 2021-11-23 PROCEDURE — 99396 PREV VISIT EST AGE 40-64: CPT | Performed by: PHYSICIAN ASSISTANT

## 2021-11-23 PROCEDURE — 91300 COVID-19,PF,PFIZER (12+ YRS): CPT | Performed by: PHYSICIAN ASSISTANT

## 2021-11-23 PROCEDURE — 36415 COLL VENOUS BLD VENIPUNCTURE: CPT | Performed by: PHYSICIAN ASSISTANT

## 2021-11-23 ASSESSMENT — ENCOUNTER SYMPTOMS
CONSTIPATION: 0
NAUSEA: 0
ARTHRALGIAS: 1
PALPITATIONS: 0
HEARTBURN: 0
ABDOMINAL PAIN: 0
HEADACHES: 0
HEMATURIA: 0
DYSURIA: 0
CHILLS: 0
FEVER: 0
PARESTHESIAS: 0
FREQUENCY: 0
EYE PAIN: 0
SHORTNESS OF BREATH: 1
SORE THROAT: 0
JOINT SWELLING: 0
COUGH: 0
DIARRHEA: 0
DIZZINESS: 0
WEAKNESS: 1
HEMATOCHEZIA: 0
NERVOUS/ANXIOUS: 0
MYALGIAS: 1

## 2021-11-23 ASSESSMENT — MIFFLIN-ST. JEOR: SCORE: 2086.89

## 2021-11-23 NOTE — PROGRESS NOTES
SUBJECTIVE:   CC: Earnest Peterson is an 41 year old male who presents for preventative health visit.       Patient has been advised of split billing requirements and indicates understanding: Yes  Healthy Habits:     Getting at least 3 servings of Calcium per day:  NO    Bi-annual eye exam:  NO    Dental care twice a year:  Yes    Sleep apnea or symptoms of sleep apnea:  Excessive snoring    Diet:  Regular (no restrictions)    Frequency of exercise:  1 day/week    Duration of exercise:  15-30 minutes    Taking medications regularly:  Yes    Medication side effects:  Not applicable    PHQ-2 Total Score: 1    Additional concerns today:  No      42 y/o new to me male here for well exam.  Overall, things have been ok.  He has found that he has not gotten much exercise and not eating healthy.  Feels he needs to lose 15-25 lbs.      He has been having some left shoulder pain for the last couple of months.        Today's PHQ-2 Score:   PHQ-2 ( 1999 Pfizer) 11/23/2021   Q1: Little interest or pleasure in doing things 1   Q2: Feeling down, depressed or hopeless 1   PHQ-2 Score 2   PHQ-2 Total Score (12-17 Years)- Positive if 3 or more points; Administer PHQ-A if positive -   Q1: Little interest or pleasure in doing things Several days   Q2: Feeling down, depressed or hopeless Several days   PHQ-2 Score 2       Abuse: Current or Past(Physical, Sexual or Emotional)- No  Do you feel safe in your environment? YES    Have you ever done Advance Care Planning? (For example, a Health Directive, POLST, or a discussion with a medical provider or your loved ones about your wishes): No, advance care planning information given to patient to review.  Patient declined advance care planning discussion at this time.    Social History     Tobacco Use     Smoking status: Never Smoker     Smokeless tobacco: Never Used   Substance Use Topics     Alcohol use: Yes     Comment: 1 glass wine, 2 days week      If you drink alcohol do you typically  "have >3 drinks per day or >7 drinks per week? No    Alcohol Use 11/23/2021   Prescreen: >3 drinks/day or >7 drinks/week? No   Prescreen: >3 drinks/day or >7 drinks/week? -   No flowsheet data found.    Last PSA: No results found for: PSA    Reviewed orders with patient. Reviewed health maintenance and updated orders accordingly - Yes  BP Readings from Last 3 Encounters:   11/23/21 124/88   07/01/20 134/83   06/20/19 110/78    Wt Readings from Last 3 Encounters:   11/23/21 110.8 kg (244 lb 4.8 oz)   07/01/20 112 kg (247 lb)   06/20/19 110.7 kg (244 lb)                    Reviewed and updated as needed this visit by clinical staff                Reviewed and updated as needed this visit by Provider                   Review of Systems   Constitutional: Negative for chills and fever.   HENT: Negative for congestion, ear pain, hearing loss and sore throat.    Eyes: Negative for pain and visual disturbance.   Respiratory: Positive for shortness of breath. Negative for cough.    Cardiovascular: Negative for chest pain, palpitations and peripheral edema.   Gastrointestinal: Negative for abdominal pain, constipation, diarrhea, heartburn, hematochezia and nausea.   Genitourinary: Negative for dysuria, frequency, genital sores, hematuria, impotence, penile discharge and urgency.   Musculoskeletal: Positive for arthralgias and myalgias. Negative for joint swelling.   Skin: Negative for rash.   Neurological: Positive for weakness. Negative for dizziness, headaches and paresthesias.   Psychiatric/Behavioral: Negative for mood changes. The patient is not nervous/anxious.          OBJECTIVE:   /88   Pulse 75   Temp 97.1  F (36.2  C) (Temporal)   Ht 1.902 m (6' 2.88\")   Wt 110.8 kg (244 lb 4.8 oz)   SpO2 98%   BMI 30.63 kg/m      Physical Exam  GENERAL: alert and no distress  EYES: Eyes grossly normal to inspection, PERRL and conjunctivae and sclerae normal  HENT: ear canals and TM's normal, nose and mouth without ulcers " "or lesions  NECK: no adenopathy, no asymmetry, masses, or scars and thyroid normal to palpation  RESP: lungs clear to auscultation - no rales, rhonchi or wheezes  CV: regular rate and rhythm, normal S1 S2, no S3 or S4, no murmur, click or rub, no peripheral edema and peripheral pulses strong  ABDOMEN: soft, nontender, no hepatosplenomegaly, no masses and bowel sounds normal  MS: no gross musculoskeletal defects noted, no edema  SKIN: no suspicious lesions or rashes  NEURO: Normal strength and tone, mentation intact and speech normal  PSYCH: mentation appears normal, affect normal/bright    Diagnostic Test Results:  Labs reviewed in Epic    ASSESSMENT/PLAN:   (Z00.00) Routine general medical examination at a health care facility  (primary encounter diagnosis)  Comment:   Plan: CBC with platelets and differential,         Comprehensive metabolic panel (BMP + Alb, Alk         Phos, ALT, AST, Total. Bili, TP)            (M25.512) Acute pain of left shoulder  Comment:   Plan: [facility] Referral            (Z23) High priority for 2019-nCoV vaccine  Comment:   Plan: COVID-19,PF,PFIZER (12+ Yrs PURPLE LABEL)            (Z13.6) CARDIOVASCULAR SCREENING; LDL GOAL LESS THAN 160  Comment:   Plan: LDL cholesterol direct              Patient has been advised of split billing requirements and indicates understanding: Yes  COUNSELING:   Reviewed preventive health counseling, as reflected in patient instructions       Regular exercise       Healthy diet/nutrition    Estimated body mass index is 30.63 kg/m  as calculated from the following:    Height as of this encounter: 1.902 m (6' 2.88\").    Weight as of this encounter: 110.8 kg (244 lb 4.8 oz).     Weight management plan: Discussed healthy diet and exercise guidelines    He reports that he has never smoked. He has never used smokeless tobacco.      Counseling Resources:  ATP IV Guidelines  Pooled Cohorts Equation Calculator  FRAX Risk Assessment  ICSI Preventive " Guidelines  Dietary Guidelines for Americans, 2010  USDA's MyPlate  ASA Prophylaxis  Lung CA Screening    LEELA Starkey [facility]

## 2021-11-24 LAB
ALBUMIN SERPL-MCNC: 3.8 G/DL (ref 3.4–5)
ALP SERPL-CCNC: 99 U/L (ref 40–150)
ALT SERPL W P-5'-P-CCNC: 75 U/L (ref 0–70)
ANION GAP SERPL CALCULATED.3IONS-SCNC: 5 MMOL/L (ref 3–14)
AST SERPL W P-5'-P-CCNC: 27 U/L (ref 0–45)
BILIRUB SERPL-MCNC: 0.6 MG/DL (ref 0.2–1.3)
BUN SERPL-MCNC: 12 MG/DL (ref 7–30)
CALCIUM SERPL-MCNC: 8.8 MG/DL (ref 8.5–10.1)
CHLORIDE BLD-SCNC: 106 MMOL/L (ref 94–109)
CO2 SERPL-SCNC: 27 MMOL/L (ref 20–32)
CREAT SERPL-MCNC: 0.99 MG/DL (ref 0.66–1.25)
GFR SERPL CREATININE-BSD FRML MDRD: >90 ML/MIN/1.73M2
GLUCOSE BLD-MCNC: 90 MG/DL (ref 70–99)
LDLC SERPL CALC-MCNC: 103 MG/DL
POTASSIUM BLD-SCNC: 4.5 MMOL/L (ref 3.4–5.3)
PROT SERPL-MCNC: 7.3 G/DL (ref 6.8–8.8)
SODIUM SERPL-SCNC: 138 MMOL/L (ref 133–144)

## 2021-11-26 NOTE — RESULT ENCOUNTER NOTE
Dear Earnest    Your test results are attached, feel free to contact me via [email or web address]     Everything looks just fine.  Keep up the good work.    Gilbert Marquis PA-C

## 2021-12-14 ENCOUNTER — THERAPY VISIT (OUTPATIENT)
Dept: PHYSICAL THERAPY | Facility: CLINIC | Age: 41
End: 2021-12-14
Attending: PHYSICIAN ASSISTANT
Payer: COMMERCIAL

## 2021-12-14 DIAGNOSIS — M25.512 ACUTE PAIN OF LEFT SHOULDER: ICD-10-CM

## 2021-12-14 PROCEDURE — 97110 THERAPEUTIC EXERCISES: CPT | Mod: GP | Performed by: PHYSICAL THERAPIST

## 2021-12-14 PROCEDURE — 97161 PT EVAL LOW COMPLEX 20 MIN: CPT | Mod: GP | Performed by: PHYSICAL THERAPIST

## 2021-12-14 NOTE — PROGRESS NOTES
Physical Therapy Initial Evaluation  Subjective:  The history is provided by the patient. No  was used.   Patient Health History  Earnest Peterson being seen for Left shoulder pain after covid vaccine.     Date of Onset: early summer and got PT order on 11/23/21.   HPI: Documentation: after vaccine.   Pain score: 3-8/10.  General health as reported by patient is fair.  Pertinent medical history includes: heart problems.   Red flags:  None as reported by patient.  Medical allergies: none.   Surgeries include:  Heart surgery. Other surgery history details: cardiac ablation.    Current medications:  None.    Current occupation .   Primary job tasks include:  Computer work and prolonged sitting.                  Therapist Generated HPI Evaluation         Type of problem:  Left shoulder.    This is a chronic condition.  Condition occurred with:  Other.  Where condition occurred: at home.  Patient reports pain:  Lateral.  Pain is described as sharp and is intermittent.  Pain is worse during the day.  Since onset symptoms are unchanged.  Associated symptoms:  Loss of motion/stiffness. Symptoms are exacerbated by lifting, carrying, lying on extremity and using arm overhead (will wake him up at night if on L side or if on R side and letting left arm hang over the edge of the bed)  Relieved by: stretching it out, but hasn't tried anything else.      Restrictions due to condition include:  Working in normal job without restrictions.  Barriers include:  None as reported by patient.                        Objective:  Standing Alignment:      Shoulder/UE:  Rounded shoulders                                       Shoulder Evaluation:  ROM:  AROM:    Flexion:  Left:  148(tught post shoulder)    Right:  165  Extension: Left: 30++Right: 60  Abduction:  Left: 107+   Right:  180      External Rotation:  Left:  51 tight    Right:  63          Flexion/External Rotation:  Left:  T1 tight    Right:   T4  Extension/Internal Rotation:  Left:  T9++    Right:  T4          Strength:    Flexion: Left:5/5    Pain: -/+    Right: 5/5     Pain:   Extension:  Left: 5/5    Pain:    Right: 5/5    Pain:  Abduction:  Left: 4/5   Pain:+    Right: 5/5     Pain:    Internal Rotation:  Left:5/5     Pain:    Right: 5/5     Pain:  External Rotation:   Left:5/5     Pain:   Right:5/5     Pain:    Horizontal Abduction:  Left:5/5     Pain:    Right:5/5    Pain:    Elbow Flexion:  Left:5/5     Pain:    Right:5/5     Pain:    Stability Testing:  normal      Special Tests:    Left shoulder positive for the following special tests:  Impingement and Bursal  Left shoulder negative for the following special tests:  Labral and Acromioclavicular    Right shoulder negative for the following special tests:Labral; Impingement; Bursal and Acrimioclavicular  Palpation:    Left shoulder tenderness present at:  Biceps  Left shoulder tenderness not present at: Acrimioclavicular or Supraspinatus    Right shoulder tenderness not present at:Acrimioclavicular; Biceps or Supraspinatus                                     General     ROS    Assessment/Plan:    Patient is a 41 year old male with left side shoulder complaints.    Patient has the following significant findings with corresponding treatment plan.                Diagnosis 1:  L shoulder pain with subacromial bursitis  Pain -  self management, education and home program  Decreased ROM/flexibility - manual therapy, therapeutic exercise and home program  Decreased strength - therapeutic exercise, therapeutic activities and home program  Impaired muscle performance - neuro re-education  Decreased function - therapeutic activities, home program and functional performance testing    Therapy Evaluation Codes:   1) History comprised of:   Personal factors that impact the plan of care:      None.    Comorbidity factors that impact the plan of care are:      None.     Medications impacting care:  None.  2) Examination of Body Systems comprised of:   Body structures and functions that impact the plan of care:      Shoulder.   Activity limitations that impact the plan of care are:      Bathing, Cooking, Dressing, Lifting, Sports and Sleeping.  3) Clinical presentation characteristics are:   Stable/Uncomplicated.  4) Decision-Making    Low complexity using standardized patient assessment instrument and/or measureable assessment of functional outcome.  Cumulative Therapy Evaluation is: Low complexity.    Previous and current functional limitations:  (See Goal Flow Sheet for this information)    Short term and Long term goals: (See Goal Flow Sheet for this information)     Communication ability:  Patient appears to be able to clearly communicate and understand verbal and written communication and follow directions correctly.  Treatment Explanation - The following has been discussed with the patient:   RX ordered/plan of care  Anticipated outcomes  Possible risks and side effects  This patient would benefit from PT intervention to resume normal activities.   Rehab potential is excellent.    Frequency:  2 X a month, once daily  Duration:  for   3 months  Discharge Plan:  Achieve all LTG.  Independent in home treatment program.  Reach maximal therapeutic benefit.    Please refer to the daily flowsheet for treatment today, total treatment time and time spent performing 1:1 timed codes.

## 2022-01-13 ENCOUNTER — THERAPY VISIT (OUTPATIENT)
Dept: PHYSICAL THERAPY | Facility: CLINIC | Age: 42
End: 2022-01-13
Payer: COMMERCIAL

## 2022-01-13 DIAGNOSIS — M25.512 ACUTE PAIN OF LEFT SHOULDER: ICD-10-CM

## 2022-01-13 PROCEDURE — 97140 MANUAL THERAPY 1/> REGIONS: CPT | Mod: GP | Performed by: PHYSICAL THERAPIST

## 2022-01-13 PROCEDURE — 97110 THERAPEUTIC EXERCISES: CPT | Mod: GP | Performed by: PHYSICAL THERAPIST

## 2022-03-10 PROBLEM — M25.512 ACUTE PAIN OF LEFT SHOULDER: Status: RESOLVED | Noted: 2021-12-14 | Resolved: 2022-03-10

## 2022-03-10 NOTE — PROGRESS NOTES
Discharge Note    Progress reporting period is from initial evaluation date (please see noted date below) to Jan 13, 2022.  Linked Episodes   Type: Episode: Status: Noted: Resolved: Last update: Updated by:   PHYSICAL THERAPY L upsmnrca10/14/21 Active 12/14/2021 1/13/2022  2:48 PM Misa Berrios, PT      Comments:       Earnest failed to follow up and current status is unknown.  Please see information below for last relevant information on current status.  Patient seen for 2 visits.    SUBJECTIVE  Subjective changes noted by patient:  No longer has constant pain and ROM better, but not 100%.  Sleeping better without being woken up due to shoulder pain.  Is still taking ibuprofen 2x/day though.  .  Current pain level is  (2-3/10).     Previous pain level was   (3-8/10).   Changes in function:  Yes (See Goal flowsheet attached for changes in current functional level)  Adverse reaction to treatment or activity: None    OBJECTIVE  Changes noted in objective findings: L shoulder AROM: Flex 165, ext 40++( pain anterior), abd 167(painful around 90), Er 60, ext/ir T8+.     ASSESSMENT/PLAN  Diagnosis: L subacromial bursitis   Updated problem list and treatment plan:   Pain - HEP  Decreased ROM/flexibility - HEP  Decreased function - HEP  Decreased strength - HEP  Impaired muscle performance - HEP  STG/LTGs have been met or progress has been made towards goals:  Yes, please see goal flowsheet for most current information  Assessment of Progress: current status is unknown.    Last current status:     Self Management Plans:  HEP  I have re-evaluated this patient and find that the nature, scope, duration and intensity of the therapy is appropriate for the medical condition of the patient.  Earnest continues to require the following intervention to meet STG and LTG's:  HEP.    Recommendations:  Discharge with current home program.  Patient to follow up with MD as needed.    Please refer to the daily flowsheet for treatment today,  total treatment time and time spent performing 1:1 timed codes.

## 2022-05-19 ENCOUNTER — VIRTUAL VISIT (OUTPATIENT)
Dept: FAMILY MEDICINE | Facility: CLINIC | Age: 42
End: 2022-05-19
Payer: COMMERCIAL

## 2022-05-19 DIAGNOSIS — U07.1 INFECTION DUE TO 2019 NOVEL CORONAVIRUS: Primary | ICD-10-CM

## 2022-05-19 PROCEDURE — 99213 OFFICE O/P EST LOW 20 MIN: CPT | Mod: GT | Performed by: FAMILY MEDICINE

## 2022-05-19 NOTE — PROGRESS NOTES
Earnest is a 41 year old who is being evaluated via a billable video visit.      How would you like to obtain your AVS? MyChart  If the video visit is dropped, the invitation should be resent by: Send to e-mail at: [email or web address]  Will anyone else be joining your video visit? No    Video Start Time: 11:35 AM      A/P:      ICD-10-CM    1. Infection due to 2019 novel coronavirus  U07.1 nirmatrelvir and ritonavir (PAXLOVID) therapy pack     Reviewed with pt.  He is fully vaccinated and boostered without risk factors for severe disease.  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  I do not feel this confers significant increased risk.  BMI of 30 might increase risk somewhat depending on risk calculator used.    Reviewed EUA for paxlovid and role in reduction of risk for severe illness, hospitalization and death.  Reviewed that time frame of symptoms can be lessened as well but unclear by how much.    Reviewed side effects.  Reviewed drug-drug interactions which for this pt include mometasone nasal spray.  Pt reports he has not been using that medication for the alt few days had has no problem avoiding it during his therapy.    Pt wishes to proceed with therapy.  Prescription sent.  Reviewed isolation recommendations and reasons to seek additional care for worsening symptoms.    Subjective   Earnest is a 41 year old who presents for the following health issues      HPI       COVID-19 Symptom Review  How many days ago did these symptoms start? 1    Are any of the following symptoms significant for you?  New or worsening difficulty breathing? Yes    Please describe what kind of difficulty you are having breathing:Mild dyspnea (able to do ADLs without difficulty, mild shortness of breath with activities such as climbing one or two flights of stairs or walking briskly)    Worsening cough? Yes, I am coughing up mucus.    Fever or chills? Yes, I felt feverish or had  chills.    Headache: YES    Sore throat: no    Chest pain: no    Diarrhea: no    Body aches? YES    Congestion     What treatments has patient tried? Dayquil, blade   Does patient live in a nursing home, group home, or shelter? no  Does patient have a way to get food/medications during quarantined? Yes, I have a friend or family member who can help me.    Symptoms began yesterday afternoon with cough, nasal and sinus congestion.  Has had chills, body aches.  No fever noted.    Has been fully vaccinated and boostered.    H/o of cardiomyopathy due to PVC's    Review of Systems   Constitutional, HEENT, cardiovascular, pulmonary, gi and gu systems are negative, except as otherwise noted.      Objective           Vitals:  No vitals were obtained today due to virtual visit.    Physical Exam   GENERAL: Healthy, alert and no distress  EYES: Eyes grossly normal to inspection.  No discharge or erythema, or obvious scleral/conjunctival abnormalities.  RESP: No audible wheeze, cough, or visible cyanosis.  No visible retractions or increased work of breathing.    SKIN: Visible skin clear. No significant rash, abnormal pigmentation or lesions.  NEURO: Cranial nerves grossly intact.  Mentation and speech appropriate for age.  PSYCH: Mentation appears normal, affect normal/bright, judgement and insight intact, normal speech and appearance well-groomed.    Epic reviewed            Video-Visit Details    Type of service:  Video Visit    Video End Time: 11:50 AM      Originating Location (pt. Location): Home    Distant Location (provider location):  [facility]     Platform used for Video Visit: [employer]

## 2022-05-31 ENCOUNTER — E-VISIT (OUTPATIENT)
Dept: FAMILY MEDICINE | Facility: CLINIC | Age: 42
End: 2022-05-31
Payer: COMMERCIAL

## 2022-05-31 DIAGNOSIS — R05.9 COUGH: Primary | ICD-10-CM

## 2022-05-31 PROCEDURE — 99207 PR NON-BILLABLE SERV PER CHARTING: CPT | Performed by: PHYSICIAN ASSISTANT

## 2022-12-20 ENCOUNTER — OFFICE VISIT (OUTPATIENT)
Dept: FAMILY MEDICINE | Facility: CLINIC | Age: 42
End: 2022-12-20
Payer: COMMERCIAL

## 2022-12-20 VITALS
HEART RATE: 68 BPM | BODY MASS INDEX: 31.05 KG/M2 | WEIGHT: 249.7 LBS | OXYGEN SATURATION: 100 % | DIASTOLIC BLOOD PRESSURE: 81 MMHG | HEIGHT: 75 IN | TEMPERATURE: 97.5 F | SYSTOLIC BLOOD PRESSURE: 132 MMHG

## 2022-12-20 DIAGNOSIS — Z13.220 SCREENING FOR HYPERLIPIDEMIA: ICD-10-CM

## 2022-12-20 DIAGNOSIS — Z00.00 ROUTINE GENERAL MEDICAL EXAMINATION AT A HEALTH CARE FACILITY: Primary | ICD-10-CM

## 2022-12-20 DIAGNOSIS — Z23 HIGH PRIORITY FOR 2019-NCOV VACCINE: ICD-10-CM

## 2022-12-20 DIAGNOSIS — Z23 NEED FOR VACCINATION: ICD-10-CM

## 2022-12-20 DIAGNOSIS — F43.21 ADJUSTMENT DISORDER WITH DEPRESSED MOOD: ICD-10-CM

## 2022-12-20 PROBLEM — I42.8 CARDIOMYOPATHY, NONISCHEMIC (H): Status: RESOLVED | Noted: 2019-01-28 | Resolved: 2022-12-20

## 2022-12-20 LAB
ALBUMIN SERPL BCG-MCNC: 4.2 G/DL (ref 3.5–5.2)
ALP SERPL-CCNC: 91 U/L (ref 40–129)
ALT SERPL W P-5'-P-CCNC: 32 U/L (ref 10–50)
ANION GAP SERPL CALCULATED.3IONS-SCNC: 11 MMOL/L (ref 7–15)
AST SERPL W P-5'-P-CCNC: 27 U/L (ref 10–50)
BASOPHILS # BLD AUTO: 0 10E3/UL (ref 0–0.2)
BASOPHILS NFR BLD AUTO: 0 %
BILIRUB SERPL-MCNC: 0.6 MG/DL
BUN SERPL-MCNC: 11.4 MG/DL (ref 6–20)
CALCIUM SERPL-MCNC: 9.1 MG/DL (ref 8.6–10)
CHLORIDE SERPL-SCNC: 104 MMOL/L (ref 98–107)
CHOLEST SERPL-MCNC: 194 MG/DL
CREAT SERPL-MCNC: 0.95 MG/DL (ref 0.67–1.17)
DEPRECATED HCO3 PLAS-SCNC: 26 MMOL/L (ref 22–29)
EOSINOPHIL # BLD AUTO: 0.1 10E3/UL (ref 0–0.7)
EOSINOPHIL NFR BLD AUTO: 1 %
ERYTHROCYTE [DISTWIDTH] IN BLOOD BY AUTOMATED COUNT: 14 % (ref 10–15)
GFR SERPL CREATININE-BSD FRML MDRD: >90 ML/MIN/1.73M2
GLUCOSE SERPL-MCNC: 84 MG/DL (ref 70–99)
HCT VFR BLD AUTO: 44.7 % (ref 40–53)
HDLC SERPL-MCNC: 51 MG/DL
HGB BLD-MCNC: 15.3 G/DL (ref 13.3–17.7)
LDLC SERPL CALC-MCNC: 124 MG/DL
LYMPHOCYTES # BLD AUTO: 1.5 10E3/UL (ref 0.8–5.3)
LYMPHOCYTES NFR BLD AUTO: 21 %
MCH RBC QN AUTO: 28.3 PG (ref 26.5–33)
MCHC RBC AUTO-ENTMCNC: 34.2 G/DL (ref 31.5–36.5)
MCV RBC AUTO: 83 FL (ref 78–100)
MONOCYTES # BLD AUTO: 0.7 10E3/UL (ref 0–1.3)
MONOCYTES NFR BLD AUTO: 10 %
NEUTROPHILS # BLD AUTO: 4.7 10E3/UL (ref 1.6–8.3)
NEUTROPHILS NFR BLD AUTO: 67 %
NONHDLC SERPL-MCNC: 143 MG/DL
PLATELET # BLD AUTO: 235 10E3/UL (ref 150–450)
POTASSIUM SERPL-SCNC: 4.1 MMOL/L (ref 3.4–5.3)
PROT SERPL-MCNC: 6.7 G/DL (ref 6.4–8.3)
RBC # BLD AUTO: 5.4 10E6/UL (ref 4.4–5.9)
SODIUM SERPL-SCNC: 141 MMOL/L (ref 136–145)
TRIGL SERPL-MCNC: 97 MG/DL
WBC # BLD AUTO: 7 10E3/UL (ref 4–11)

## 2022-12-20 PROCEDURE — 80053 COMPREHEN METABOLIC PANEL: CPT | Performed by: PHYSICIAN ASSISTANT

## 2022-12-20 PROCEDURE — 96127 BRIEF EMOTIONAL/BEHAV ASSMT: CPT | Performed by: PHYSICIAN ASSISTANT

## 2022-12-20 PROCEDURE — 0124A COVID-19 VACCINE BIVALENT BOOSTER 12+ (PFIZER): CPT | Performed by: PHYSICIAN ASSISTANT

## 2022-12-20 PROCEDURE — 36415 COLL VENOUS BLD VENIPUNCTURE: CPT | Performed by: PHYSICIAN ASSISTANT

## 2022-12-20 PROCEDURE — 90686 IIV4 VACC NO PRSV 0.5 ML IM: CPT | Performed by: PHYSICIAN ASSISTANT

## 2022-12-20 PROCEDURE — 99396 PREV VISIT EST AGE 40-64: CPT | Mod: 25 | Performed by: PHYSICIAN ASSISTANT

## 2022-12-20 PROCEDURE — 80061 LIPID PANEL: CPT | Performed by: PHYSICIAN ASSISTANT

## 2022-12-20 PROCEDURE — 90471 IMMUNIZATION ADMIN: CPT | Performed by: PHYSICIAN ASSISTANT

## 2022-12-20 PROCEDURE — 85025 COMPLETE CBC W/AUTO DIFF WBC: CPT | Performed by: PHYSICIAN ASSISTANT

## 2022-12-20 PROCEDURE — 99213 OFFICE O/P EST LOW 20 MIN: CPT | Mod: 25 | Performed by: PHYSICIAN ASSISTANT

## 2022-12-20 PROCEDURE — 91312 COVID-19 VACCINE BIVALENT BOOSTER 12+ (PFIZER): CPT | Performed by: PHYSICIAN ASSISTANT

## 2022-12-20 ASSESSMENT — ENCOUNTER SYMPTOMS
NERVOUS/ANXIOUS: 1
WEAKNESS: 0
CONSTIPATION: 0
COUGH: 1
ARTHRALGIAS: 0
DYSURIA: 0
HEMATOCHEZIA: 0
PALPITATIONS: 0
MYALGIAS: 0
DIARRHEA: 0
JOINT SWELLING: 0
HEARTBURN: 0
ABDOMINAL PAIN: 0
CHILLS: 0
FEVER: 0
SORE THROAT: 0
FREQUENCY: 0
EYE PAIN: 0
DIZZINESS: 0
SHORTNESS OF BREATH: 0
PARESTHESIAS: 0
HEADACHES: 0
NAUSEA: 0
HEMATURIA: 0

## 2022-12-20 ASSESSMENT — ANXIETY QUESTIONNAIRES
8. IF YOU CHECKED OFF ANY PROBLEMS, HOW DIFFICULT HAVE THESE MADE IT FOR YOU TO DO YOUR WORK, TAKE CARE OF THINGS AT HOME, OR GET ALONG WITH OTHER PEOPLE?: SOMEWHAT DIFFICULT
7. FEELING AFRAID AS IF SOMETHING AWFUL MIGHT HAPPEN: NOT AT ALL
1. FEELING NERVOUS, ANXIOUS, OR ON EDGE: MORE THAN HALF THE DAYS
GAD7 TOTAL SCORE: 6
7. FEELING AFRAID AS IF SOMETHING AWFUL MIGHT HAPPEN: NOT AT ALL
GAD7 TOTAL SCORE: 6
4. TROUBLE RELAXING: NOT AT ALL
GAD7 TOTAL SCORE: 6
IF YOU CHECKED OFF ANY PROBLEMS ON THIS QUESTIONNAIRE, HOW DIFFICULT HAVE THESE PROBLEMS MADE IT FOR YOU TO DO YOUR WORK, TAKE CARE OF THINGS AT HOME, OR GET ALONG WITH OTHER PEOPLE: SOMEWHAT DIFFICULT
2. NOT BEING ABLE TO STOP OR CONTROL WORRYING: SEVERAL DAYS
3. WORRYING TOO MUCH ABOUT DIFFERENT THINGS: SEVERAL DAYS
6. BECOMING EASILY ANNOYED OR IRRITABLE: MORE THAN HALF THE DAYS
5. BEING SO RESTLESS THAT IT IS HARD TO SIT STILL: NOT AT ALL

## 2022-12-20 ASSESSMENT — PATIENT HEALTH QUESTIONNAIRE - PHQ9
SUM OF ALL RESPONSES TO PHQ QUESTIONS 1-9: 11
SUM OF ALL RESPONSES TO PHQ QUESTIONS 1-9: 11
10. IF YOU CHECKED OFF ANY PROBLEMS, HOW DIFFICULT HAVE THESE PROBLEMS MADE IT FOR YOU TO DO YOUR WORK, TAKE CARE OF THINGS AT HOME, OR GET ALONG WITH OTHER PEOPLE: VERY DIFFICULT

## 2022-12-20 NOTE — PROGRESS NOTES
SUBJECTIVE:   CC: Earnest is an 42 year old who presents for preventative health visit.     Healthy Habits:     Getting at least 3 servings of Calcium per day:  Yes    Bi-annual eye exam:  NO    Dental care twice a year:  Yes    Sleep apnea or symptoms of sleep apnea:  None    Diet:  Regular (no restrictions) and Breakfast skipped    Frequency of exercise:  2-3 days/week    Duration of exercise:  15-30 minutes    Taking medications regularly:  Yes    Medication side effects:  None    PHQ-2 Total Score: 4    Additional concerns today:  No  Answers for HPI/ROS submitted by the patient on 12/20/2022  If you checked off any problems, how difficult have these problems made it for you to do your work, take care of things at home, or get along with other people?: Very difficult  PHQ9 TOTAL SCORE: 11  MITCH 7 TOTAL SCORE: 6          Today's PHQ-2 Score:   PHQ-2 ( 1999 Pfizer) 12/20/2022   Q1: Little interest or pleasure in doing things 2   Q2: Feeling down, depressed or hopeless 2   PHQ-2 Score 4   PHQ-2 Total Score (12-17 Years)- Positive if 3 or more points; Administer PHQ-A if positive -   Q1: Little interest or pleasure in doing things More than half the days   Q2: Feeling down, depressed or hopeless More than half the days   PHQ-2 Score 4           Social History     Tobacco Use     Smoking status: Never     Smokeless tobacco: Never   Substance Use Topics     Alcohol use: Yes     Comment: 1 glass wine, 2 days week      If you drink alcohol do you typically have >3 drinks per day or >7 drinks per week? No    Alcohol Use 12/20/2022   Prescreen: >3 drinks/day or >7 drinks/week? No   Prescreen: >3 drinks/day or >7 drinks/week? -   No flowsheet data found.    Last PSA: No results found for: PSA    Reviewed orders with patient. Reviewed health maintenance and updated orders accordingly - Yes  BP Readings from Last 3 Encounters:   12/20/22 132/81   11/23/21 124/88   07/01/20 134/83    Wt Readings from Last 3 Encounters:  "  12/20/22 113.3 kg (249 lb 11.2 oz)   11/23/21 110.8 kg (244 lb 4.8 oz)   07/01/20 112 kg (247 lb)                    Reviewed and updated as needed this visit by clinical staff    Allergies               Reviewed and updated as needed this visit by Provider                     Review of Systems   Constitutional: Negative for chills and fever.   HENT: Negative for congestion, ear pain, hearing loss and sore throat.    Eyes: Negative for pain and visual disturbance.   Respiratory: Positive for cough. Negative for shortness of breath.    Cardiovascular: Negative for chest pain, palpitations and peripheral edema.   Gastrointestinal: Negative for abdominal pain, constipation, diarrhea, heartburn, hematochezia and nausea.   Genitourinary: Negative for dysuria, frequency, genital sores, hematuria and urgency.   Musculoskeletal: Negative for arthralgias, joint swelling and myalgias.   Skin: Negative for rash.   Neurological: Negative for dizziness, weakness, headaches and paresthesias.   Psychiatric/Behavioral: Positive for mood changes. The patient is nervous/anxious.          OBJECTIVE:   /81   Pulse 68   Temp 97.5  F (36.4  C) (Temporal)   Ht 1.905 m (6' 3\")   Wt 113.3 kg (249 lb 11.2 oz)   SpO2 100%   BMI 31.21 kg/m      Physical Exam  GENERAL: alert and no distress  EYES: Eyes grossly normal to inspection  HENT: normal cephalic/atraumatic and ear canals and TM's normal  NECK: no adenopathy, no asymmetry, masses, or scars and thyroid normal to palpation  RESP: lungs clear to auscultation - no rales, rhonchi or wheezes  CV: regular rate and rhythm, normal S1 S2, no S3 or S4, no murmur, click or rub, no peripheral edema and peripheral pulses strong  ABDOMEN: soft, nontender, no hepatosplenomegaly, no masses and bowel sounds normal  MS: no gross musculoskeletal defects noted, no edema  SKIN: no suspicious lesions or rashes  NEURO: Normal strength and tone, mentation intact and speech normal  PSYCH: mentation " appears normal, affect normal/bright    Diagnostic Test Results:  Labs reviewed in Epic    ASSESSMENT/PLAN:   (Z00.00) Routine general medical examination at a health care facility  (primary encounter diagnosis)  Comment:   Plan: CBC with platelets and differential,         Comprehensive metabolic panel (BMP + Alb, Alk         Phos, ALT, AST, Total. Bili, TP)            (F43.21) Adjustment disorder with depressed mood  Comment: discussed options, and he is interested in therapy.  Also talked about some lifestyle adjustments/modifications.  Plan: Adult Mental Health  Referral            (Z13.220) Screening for hyperlipidemia  Comment:   Plan: Lipid panel reflex to direct LDL Fasting            (Z23) High priority for 2019-nCoV vaccine  Comment:   Plan: COVID-19,PF,PFIZER BOOSTER BIVALENT 12+Yrs            (Z23) Need for vaccination  Comment:   Plan: INFLUENZA VACCINE >6 MONTHS (AFLURIA/FLUZONE)                COUNSELING:   Reviewed preventive health counseling, as reflected in patient instructions       Regular exercise       Healthy diet/nutrition       Immunizations    Vaccinated for: Influenza              He reports that he has never smoked. He has never used smokeless tobacco.        LEELA Starkey [facility]

## 2022-12-20 NOTE — PROGRESS NOTES
"SUBJECTIVE:   Earnest is a 42 year old who presents for Preventive Visit.  {Split Bill scripting  The purpose of this visit is to discuss your medical history and prevent health problems before you are sick. You may be responsible for a co-pay, coinsurance, or deductible if your visit today includes services such as checking on a sore throat, having an x-ray or lab test, or treating and evaluating a new or existing condition :526676}  {Patient advised of split billing (Optional):158007}  Are you in the first 12 months of your Medicare coverage?  { :025038::\"No\"}    Healthy Habits:     Getting at least 3 servings of Calcium per day:  Yes    Bi-annual eye exam:  NO    Dental care twice a year:  Yes    Sleep apnea or symptoms of sleep apnea:  None    Diet:  Regular (no restrictions) and Breakfast skipped    Frequency of exercise:  2-3 days/week    Duration of exercise:  15-30 minutes    Taking medications regularly:  Yes    Medication side effects:  None    PHQ-2 Total Score: 4    Additional concerns today:  No      Have you ever done Advance Care Planning? (For example, a Health Directive, POLST, or a discussion with a medical provider or your loved ones about your wishes): { :664775}    {Hearing Test Done (Optional):528808}   Fall risk  { :748119}  {If any of the above assessments are answered yes, consider ordering appropriate referrals (Optional):496134::\"click delete button to remove this line now\"}  Cognitive Screening { :945401}    {Do you have sleep apnea, excessive snoring or daytime drowsiness? (Optional):864801}    Reviewed and updated as needed this visit by clinical staff                  Reviewed and updated as needed this visit by Provider                 Social History     Tobacco Use     Smoking status: Never     Smokeless tobacco: Never   Substance Use Topics     Alcohol use: Yes     Comment: 1 glass wine, 2 days week      {Rooming Staff- Complete this question if Prescreen response is not shown below " for today's visit. If you drink alcohol do you typically have >3 drinks per day or >7 drinks per week? (Optional):007042}    Alcohol Use 12/20/2022   Prescreen: >3 drinks/day or >7 drinks/week? No   Prescreen: >3 drinks/day or >7 drinks/week? -   {add AUDIT responses (Optional) (A score of 7 for adult men is an indication of hazardous drinking; a score of 8 or more is an indication of an alcohol use disorder.  A score of 7 or more for adult women is an indication of hazardous drinking or an alchohol use disorder):550535}    {Outside tests to abstract? :163865}    {additional problems to add (Optional):106502}    Current providers sharing in care for this patient include: {Rooming staff:  Please update Care Team from storyboard, refresh this note and then delete this statement}  Patient Care Team:  Yarely Gonsalves APRN CNP as PCP - General (Nurse Practitioner - Family)  Jeison Ford MD as MD (Urology)  Amadeo Marquis PA-C as Assigned PCP    The following health maintenance items are reviewed in Epic and correct as of today:  Health Maintenance   Topic Date Due     HIV SCREENING  Never done     HEPATITIS C SCREENING  Never done     HEPATITIS B IMMUNIZATION (4 of 4 - Hep B Twinrix 4-dose series) 03/24/2007     LIPID  03/15/2020     COVID-19 Vaccine (4 - Booster for Pfizer series) 01/18/2022     INFLUENZA VACCINE (1) 09/01/2022     CMP  11/23/2022     CBC W/DIFFERENTIAL  11/23/2022     ANNUAL REVIEW OF HM ORDERS  11/23/2022     YEARLY PREVENTIVE VISIT  11/23/2022     DTAP/TDAP/TD IMMUNIZATION (4 - Td or Tdap) 03/05/2023     ADVANCE CARE PLANNING  11/23/2026     PHQ-2 (once per calendar year)  Completed     Pneumococcal Vaccine: Pediatrics (0 to 5 Years) and At-Risk Patients (6 to 64 Years)  Aged Out     IPV IMMUNIZATION  Aged Out     MENINGITIS IMMUNIZATION  Aged Out     {Chronicprobdata (optional):529495}  {Decision Support (Optional):113061}        Review of Systems  {ROS COMP  "(Optional):704552}    OBJECTIVE:   There were no vitals taken for this visit. Estimated body mass index is 30.63 kg/m  as calculated from the following:    Height as of 21: 1.902 m (6' 2.88\").    Weight as of 21: 110.8 kg (244 lb 4.8 oz).  Physical Exam  {Exam (Optional) :477938}    {Diagnostic Test Results (Optional):516892::\"Diagnostic Test Results:\",\"Labs reviewed in Epic\"}    ASSESSMENT / PLAN:   {Diag Picklist:750292}    {Patient advised of split billing (Optional):729217}      COUNSELING:  {Medicare Counselin}        He reports that he has never smoked. He has never used smokeless tobacco.      Appropriate preventive services were discussed with this patient, including applicable screening as appropriate for cardiovascular disease, diabetes, osteopenia/osteoporosis, and glaucoma.  As appropriate for age/gender, discussed screening for colorectal cancer, prostate cancer, breast cancer, and cervical cancer. Checklist reviewing preventive services available has been given to the patient.    Reviewed patients plan of care and provided an AVS. The {CarePlan:793523} for Earnest meets the Care Plan requirement. This Care Plan has been established and reviewed with the {PATIENT, FAMILY MEMBER, CAREGIVER:948250}.    {Counseling Resources  [place] Preventive Services Task Force  Cholesterol Screening  Health diet/nutrition  Pooled Cohorts Equation Calculator  USDA's MyPlate  ASA Prophylaxis  Lung CA Screening  Osteoporosis prevention/bone health :949239}  {Prostate Cancer Screening  Consider for men 55-69 per guidance from USPSTF :371174}    Amadeo Marquis PA-C  [facility]    Identified Health Risks:  "

## 2022-12-20 NOTE — NURSING NOTE
Prior to immunization administration, verified patients identity using patient s name and date of birth. Please see Immunization Activity for additional information.     Screening Questionnaire for Adult Immunization    Are you sick today?   No   Do you have allergies to medications, food, a vaccine component or latex?   No   Have you ever had a serious reaction after receiving a vaccination?   No   Do you have a long-term health problem with heart, lung, kidney, or metabolic disease (e.g., diabetes), asthma, a blood disorder, no spleen, complement component deficiency, a cochlear implant, or a spinal fluid leak?  Are you on long-term aspirin therapy?   No   Do you have cancer, leukemia, HIV/AIDS, or any other immune system problem?   No   Do you have a parent, brother, or sister with an immune system problem?   No   In the past 3 months, have you taken medications that affect  your immune system, such as prednisone, other steroids, or anticancer drugs; drugs for the treatment of rheumatoid arthritis, Crohn s disease, or psoriasis; or have you had radiation treatments?   No   Have you had a seizure, or a brain or other nervous system problem?   No   During the past year, have you received a transfusion of blood or blood    products, or been given immune (gamma) globulin or antiviral drug?   No   For women: Are you pregnant or is there a chance you could become       pregnant during the next month?   No   Have you received any vaccinations in the past 4 weeks?   No     Immunization questionnaire answers were all negative.        Per orders of Dr. Marquis, injection of Flu, COVID given by Emilia Crow RN. Patient instructed to remain in clinic for 15 minutes afterwards, and to report any adverse reaction to me immediately.       Screening performed by Emilia Crow RN on 12/20/2022 at 10:45 AM.     Face Mask

## 2022-12-20 NOTE — RESULT ENCOUNTER NOTE
Dear Earnest    Your test results are attached, feel free to contact me via i-drivet     Everything looks pretty good on your labs.  Let me know if you have any follow up questions.  Did you get your bike set up on your  ;)      Gilbert Marquis PA-C

## 2023-12-21 ENCOUNTER — LAB (OUTPATIENT)
Dept: FAMILY MEDICINE | Facility: CLINIC | Age: 43
End: 2023-12-21

## 2023-12-21 ENCOUNTER — OFFICE VISIT (OUTPATIENT)
Dept: FAMILY MEDICINE | Facility: CLINIC | Age: 43
End: 2023-12-21
Attending: PHYSICIAN ASSISTANT
Payer: COMMERCIAL

## 2023-12-21 VITALS
HEIGHT: 75 IN | TEMPERATURE: 96.9 F | BODY MASS INDEX: 31.08 KG/M2 | OXYGEN SATURATION: 96 % | HEART RATE: 78 BPM | DIASTOLIC BLOOD PRESSURE: 84 MMHG | RESPIRATION RATE: 19 BRPM | SYSTOLIC BLOOD PRESSURE: 124 MMHG | WEIGHT: 250 LBS

## 2023-12-21 DIAGNOSIS — Z23 NEED FOR VACCINATION: ICD-10-CM

## 2023-12-21 DIAGNOSIS — M54.50 ACUTE RIGHT-SIDED LOW BACK PAIN WITHOUT SCIATICA: ICD-10-CM

## 2023-12-21 DIAGNOSIS — Z00.00 ROUTINE GENERAL MEDICAL EXAMINATION AT A HEALTH CARE FACILITY: Primary | ICD-10-CM

## 2023-12-21 DIAGNOSIS — F43.21 ADJUSTMENT DISORDER WITH DEPRESSED MOOD: ICD-10-CM

## 2023-12-21 DIAGNOSIS — Z13.6 CARDIOVASCULAR SCREENING; LDL GOAL LESS THAN 160: ICD-10-CM

## 2023-12-21 DIAGNOSIS — I49.3 RIGHT VENTRICULAR OUTFLOW TRACT PREMATURE VENTRICULAR CONTRACTIONS (PVCS): ICD-10-CM

## 2023-12-21 DIAGNOSIS — Z12.11 SCREEN FOR COLON CANCER: ICD-10-CM

## 2023-12-21 LAB
ALBUMIN SERPL BCG-MCNC: 4.2 G/DL (ref 3.5–5.2)
ALP SERPL-CCNC: 93 U/L (ref 40–150)
ALT SERPL W P-5'-P-CCNC: 39 U/L (ref 0–70)
ANION GAP SERPL CALCULATED.3IONS-SCNC: 9 MMOL/L (ref 7–15)
AST SERPL W P-5'-P-CCNC: 26 U/L (ref 0–45)
BASOPHILS # BLD AUTO: 0 10E3/UL (ref 0–0.2)
BASOPHILS NFR BLD AUTO: 0 %
BILIRUB SERPL-MCNC: 0.5 MG/DL
BUN SERPL-MCNC: 14.2 MG/DL (ref 6–20)
CALCIUM SERPL-MCNC: 9.1 MG/DL (ref 8.6–10)
CHLORIDE SERPL-SCNC: 105 MMOL/L (ref 98–107)
CHOLEST SERPL-MCNC: 184 MG/DL
CREAT SERPL-MCNC: 0.98 MG/DL (ref 0.67–1.17)
DEPRECATED HCO3 PLAS-SCNC: 27 MMOL/L (ref 22–29)
EGFRCR SERPLBLD CKD-EPI 2021: >90 ML/MIN/1.73M2
EOSINOPHIL # BLD AUTO: 0.1 10E3/UL (ref 0–0.7)
EOSINOPHIL NFR BLD AUTO: 1 %
ERYTHROCYTE [DISTWIDTH] IN BLOOD BY AUTOMATED COUNT: 12.8 % (ref 10–15)
FASTING STATUS PATIENT QL REPORTED: YES
GLUCOSE SERPL-MCNC: 84 MG/DL (ref 70–99)
HCT VFR BLD AUTO: 47.5 % (ref 40–53)
HDLC SERPL-MCNC: 46 MG/DL
HGB BLD-MCNC: 15.8 G/DL (ref 13.3–17.7)
IMM GRANULOCYTES # BLD: 0 10E3/UL
IMM GRANULOCYTES NFR BLD: 0 %
LDLC SERPL CALC-MCNC: 115 MG/DL
LYMPHOCYTES # BLD AUTO: 1.6 10E3/UL (ref 0.8–5.3)
LYMPHOCYTES NFR BLD AUTO: 22 %
MCH RBC QN AUTO: 27.5 PG (ref 26.5–33)
MCHC RBC AUTO-ENTMCNC: 33.3 G/DL (ref 31.5–36.5)
MCV RBC AUTO: 83 FL (ref 78–100)
MONOCYTES # BLD AUTO: 0.7 10E3/UL (ref 0–1.3)
MONOCYTES NFR BLD AUTO: 10 %
NEUTROPHILS # BLD AUTO: 4.8 10E3/UL (ref 1.6–8.3)
NEUTROPHILS NFR BLD AUTO: 66 %
NONHDLC SERPL-MCNC: 138 MG/DL
PLATELET # BLD AUTO: 243 10E3/UL (ref 150–450)
POTASSIUM SERPL-SCNC: 4.3 MMOL/L (ref 3.4–5.3)
PROT SERPL-MCNC: 6.9 G/DL (ref 6.4–8.3)
RBC # BLD AUTO: 5.75 10E6/UL (ref 4.4–5.9)
SODIUM SERPL-SCNC: 141 MMOL/L (ref 135–145)
TRIGL SERPL-MCNC: 117 MG/DL
WBC # BLD AUTO: 7.2 10E3/UL (ref 4–11)

## 2023-12-21 PROCEDURE — 80053 COMPREHEN METABOLIC PANEL: CPT | Performed by: PHYSICIAN ASSISTANT

## 2023-12-21 PROCEDURE — 90471 IMMUNIZATION ADMIN: CPT | Performed by: PHYSICIAN ASSISTANT

## 2023-12-21 PROCEDURE — 85025 COMPLETE CBC W/AUTO DIFF WBC: CPT | Performed by: PHYSICIAN ASSISTANT

## 2023-12-21 PROCEDURE — 36415 COLL VENOUS BLD VENIPUNCTURE: CPT | Performed by: PHYSICIAN ASSISTANT

## 2023-12-21 PROCEDURE — 99396 PREV VISIT EST AGE 40-64: CPT | Mod: 25 | Performed by: PHYSICIAN ASSISTANT

## 2023-12-21 PROCEDURE — 80061 LIPID PANEL: CPT | Performed by: PHYSICIAN ASSISTANT

## 2023-12-21 PROCEDURE — 99213 OFFICE O/P EST LOW 20 MIN: CPT | Mod: 25 | Performed by: PHYSICIAN ASSISTANT

## 2023-12-21 PROCEDURE — 96127 BRIEF EMOTIONAL/BEHAV ASSMT: CPT | Performed by: PHYSICIAN ASSISTANT

## 2023-12-21 PROCEDURE — 90715 TDAP VACCINE 7 YRS/> IM: CPT | Performed by: PHYSICIAN ASSISTANT

## 2023-12-21 RX ORDER — ESCITALOPRAM OXALATE 10 MG/1
10 TABLET ORAL DAILY
Qty: 30 TABLET | Refills: 3 | Status: SHIPPED | OUTPATIENT
Start: 2023-12-21 | End: 2024-04-16

## 2023-12-21 ASSESSMENT — PATIENT HEALTH QUESTIONNAIRE - PHQ9
SUM OF ALL RESPONSES TO PHQ QUESTIONS 1-9: 10
SUM OF ALL RESPONSES TO PHQ QUESTIONS 1-9: 10
10. IF YOU CHECKED OFF ANY PROBLEMS, HOW DIFFICULT HAVE THESE PROBLEMS MADE IT FOR YOU TO DO YOUR WORK, TAKE CARE OF THINGS AT HOME, OR GET ALONG WITH OTHER PEOPLE: SOMEWHAT DIFFICULT

## 2023-12-21 ASSESSMENT — ENCOUNTER SYMPTOMS
HEMATOCHEZIA: 0
ABDOMINAL PAIN: 0
HEMATURIA: 0

## 2023-12-21 ASSESSMENT — PAIN SCALES - GENERAL: PAINLEVEL: EXTREME PAIN (8)

## 2023-12-21 NOTE — PROGRESS NOTES
"SUBJECTIVE:   Earnest is a 43 year old, presenting for the following:  Physical        12/21/2023     8:30 AM   Additional Questions   Roomed by paul tsai   Accompanied by gonzalo         12/21/2023     8:30 AM   Patient Reported Additional Medications   Patient reports taking the following new medications n/a       Healthy Habits:     Getting at least 3 servings of Calcium per day:  Yes    Bi-annual eye exam:  NO    Dental care twice a year:  Yes    Sleep apnea or symptoms of sleep apnea:  Excessive snoring    Diet:  Regular (no restrictions)    Frequency of exercise:  2-3 days/week    Duration of exercise:  15-30 minutes    Taking medications regularly:  Yes    Medication side effects:  Not applicable      Today's PHQ-9 Score:       12/21/2023     8:21 AM   PHQ-9 SCORE   PHQ-9 Total Score MyChart 10 (Moderate depression)   PHQ-9 Total Score 10             Social History     Tobacco Use    Smoking status: Never    Smokeless tobacco: Never   Substance Use Topics    Alcohol use: Yes     Comment: 1 glass wine, 2 days week              12/21/2023     8:19 AM   Alcohol Use   Prescreen: >3 drinks/day or >7 drinks/week? No       Last PSA: No results found for: \"PSA\"    Reviewed orders with patient. Reviewed health maintenance and updated orders accordingly - Yes  BP Readings from Last 3 Encounters:   12/21/23 124/84   12/20/22 132/81   11/23/21 124/88    Wt Readings from Last 3 Encounters:   12/21/23 113.4 kg (250 lb)   12/20/22 113.3 kg (249 lb 11.2 oz)   11/23/21 110.8 kg (244 lb 4.8 oz)                    Reviewed and updated as needed this visit by clinical staff   Tobacco  Allergies               Reviewed and updated as needed this visit by Provider                     Review of Systems   Gastrointestinal:  Negative for abdominal pain and hematochezia.   Genitourinary:  Negative for hematuria.         OBJECTIVE:   /84 (BP Location: Left arm, Patient Position: Sitting, Cuff Size: Adult Regular)   Pulse 78   Temp " "96.9  F (36.1  C) (Temporal)   Resp 19   Ht 1.905 m (6' 3\")   Wt 113.4 kg (250 lb)   SpO2 96%   BMI 31.25 kg/m      Physical Exam  GENERAL: alert and no distress  EYES: Eyes grossly normal to inspection  HENT: normal cephalic/atraumatic, ear canals and TM's normal, nose and mouth without ulcers or lesions, oropharynx clear, and oral mucous membranes moist  NECK: no adenopathy, no asymmetry, masses, or scars and thyroid normal to palpation  RESP: lungs clear to auscultation - no rales, rhonchi or wheezes  CV: regular rate and rhythm, normal S1 S2, no S3 or S4, no murmur, click or rub, no peripheral edema and peripheral pulses strong  ABDOMEN: soft, nontender, no hepatosplenomegaly, no masses and bowel sounds normal  MS: no gross musculoskeletal defects noted, no edema  SKIN: no suspicious lesions or rashes  NEURO: Normal strength and tone, mentation intact and speech normal  PSYCH: mentation appears normal, affect normal/bright    Diagnostic Test Results:  Labs reviewed in Epic    ASSESSMENT/PLAN:   (Z00.00) Routine general medical examination at a health care facility  (primary encounter diagnosis)  Comment:   Plan: COMPREHENSIVE METABOLIC PANEL, CBC with         Platelets & Differential, PRIMARY CARE         FOLLOW-UP SCHEDULING            (M54.50) Acute right-sided low back pain without sciatica  Comment: stable for now, may look at PHYSICAL THERAPY if symptoms persist or worsen   Plan:     (F43.21) Adjustment disorder with depressed mood  Comment: discussed next step options for treatment including med and non med options.  Has been working with therapist already, who recommended med discussion.  Discussed potential benefit and side effects of several classes of meds.  Decided to start with lexapro.  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)  Comment: hx of, due for recheck  Plan: Adult Cardiology Eval  Referral            (Z13.6) CARDIOVASCULAR SCREENING; LDL GOAL LESS THAN 160  Comment:   Plan: Lipid panel reflex to direct LDL Fasting            (Z23) Need for vaccination  Comment:   Plan: TDAP 10-64Y (ADACEL,BOOSTRIX)                Depression Screening Follow Up        12/21/2023     8:21 AM   PHQ   PHQ-9 Total Score 10   Q9: Thoughts of better off dead/self-harm past 2 weeks Several days   F/U: Thoughts of suicide or self-harm No   F/U: Safety concerns No         COUNSELING:   Reviewed preventive health counseling, as reflected in patient instructions       Regular exercise       Healthy diet/nutrition        He reports that he has never smoked. He has never used smokeless tobacco.            Amadeo Marquis PA-C  [facility]  Answers submitted by the patient for this visit:  Patient Health Questionnaire (Submitted on 12/21/2023)  If you checked off any problems, how difficult have these problems made it for you to do your work, take care of things at home, or get along with other people?: Somewhat difficult  PHQ9 TOTAL SCORE: 10

## 2024-01-03 LAB — NONINV COLON CA DNA+OCC BLD SCRN STL QL: NEGATIVE

## 2024-01-29 ENCOUNTER — OFFICE VISIT (OUTPATIENT)
Dept: CARDIOLOGY | Facility: CLINIC | Age: 44
End: 2024-01-29
Payer: COMMERCIAL

## 2024-01-29 VITALS
BODY MASS INDEX: 31.53 KG/M2 | DIASTOLIC BLOOD PRESSURE: 85 MMHG | WEIGHT: 258.9 LBS | SYSTOLIC BLOOD PRESSURE: 122 MMHG | HEIGHT: 76 IN | HEART RATE: 64 BPM

## 2024-01-29 DIAGNOSIS — E78.5 HYPERLIPIDEMIA LDL GOAL <100: Primary | ICD-10-CM

## 2024-01-29 DIAGNOSIS — I49.3 RIGHT VENTRICULAR OUTFLOW TRACT PREMATURE VENTRICULAR CONTRACTIONS (PVCS): ICD-10-CM

## 2024-01-29 DIAGNOSIS — Z82.49 FAMILY HISTORY OF THORACIC AORTIC ANEURYSM: ICD-10-CM

## 2024-01-29 PROCEDURE — 99204 OFFICE O/P NEW MOD 45 MIN: CPT | Performed by: INTERNAL MEDICINE

## 2024-01-29 PROCEDURE — 93000 ELECTROCARDIOGRAM COMPLETE: CPT | Performed by: INTERNAL MEDICINE

## 2024-01-29 RX ORDER — IBUPROFEN 200 MG
200 TABLET ORAL DAILY PRN
COMMUNITY

## 2024-01-29 NOTE — LETTER
2024    Amadoe Marquis PA-C  [address]  [place] [place] [address]    RE: Earnest Greggrikki       Dear Colleague,     I had the pleasure of seeing Earnest Peterson in the [facility].  HPI and Plan:   I the pleasure of seeing Earnest Peterson in cardiology consultation at request of her primary care provider for cardiovascular screening and family history of ascending aorta aneurysm.    He was seen by me in 2019 for history of premature ventricular complexes.  At that time he was noted to have RVOT PVCs and underwent successful ablation in .  He also had an MRI in the past which had shown some delayed enhancement in nonischemic pattern and was thought to be related to prior myocarditis.    Recently, he was informed that 3 of his aunts have been diagnosed with ascending aorta aneurysm and dissection.  These are all on his father side.  He was concerned about that and talk to his primary care provider and she referred him back to us.  This was also done because his father  recently.  In his 50s, his father had atrial fibrillation and coronary artery disease including heart attack as well as bypass.    He does have hyperlipidemia.  His last LDL was 115 in December of this year.  HDL was 46.  He has had previous echocardiograms few years ago and did not show any aortic root or ascending aorta enlargement at that time.  Ejection fraction was normal.    He has had no recurrent palpitations or skipped beats.  No chest pain or shortness of breath.  He does not do any formal exercise but at least walks twice a week.    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    On exam, regular rate and rhythm.  No murmurs.  Chest was reauscultation.    EKG done today was revealing sinus rhythm without any ischemic changes.    Impression    1.  History of RVOT PVCs, s/p ablation in 2017    2.  Cardiovascular screening, family history of premature CAD  with mild hyperlipidemia    3.  Family history of ascending aorta aneurysm in paternal aunts      Discussion    At this time, patient has no active cardiac symptoms.  He has no recurrent palpitations.  He is quite concerned about his family history.  To further screen and for stratify, recommended a coronary calcium scan.  He understands a self-pay test if insurance does not cover it.  Involves a little bit of radiation.  Depending on his coronary calcium score, we will make further recommendations.  If there is abnormal coronary calcium, he will need statin to get his LDL below 100.  I will also check a lipoprotein a because of family history of premature CAD.    Given family history of ascending aorta aneurysm and dissection, will obtain an thoracic MRA to assess for aortic size.    We will call him with the results of the above test and further recommendations.  Thank you for allowing us to participate in care of this nice patient.    Sincerely,    Jose Cruz Lopez MD    Today's clinic visit entailed:  Review of external notes as documented elsewhere in note  Review of the result(s) of each unique test - lipid profile, prior CMR, ekg  Ordering of each unique test    Provider  Link to [facility] Help Grid     The level of medical decision making during this visit was of moderate complexity.      Orders Placed This Encounter   Procedures    EKG 12-lead complete w/read - Clinics (performed today)       Orders Placed This Encounter   Medications    ibuprofen (ADVIL/MOTRIN) 200 MG tablet     Sig: Take 200 mg by mouth daily as needed for pain       There are no discontinued medications.      Encounter Diagnosis   Name Primary?    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)        CURRENT MEDICATIONS:  Current Outpatient Medications   Medication Sig Dispense Refill    escitalopram (LEXAPRO) 10 MG tablet Take 1 tablet (10 mg) by mouth daily 30 tablet 3    ibuprofen (ADVIL/MOTRIN) 200 MG tablet Take  200 mg by mouth daily as needed for pain      mometasone (NASONEX) 50 MCG/ACT nasal spray Spray 2 sprays into both nostrils daily         ALLERGIES     Allergies   Allergen Reactions    Nkda [No Known Drug Allergy]        PAST MEDICAL HISTORY:  Past Medical History:   Diagnosis Date    Allergic rhinitis     Generalized anxiety disorder 10/29/2014     Diagnosis updated by automated process. Provider to review and confirm.    Heart rate problem 10/1/2015    Moderate major depression (H) 10/29/2014    PVC's (premature ventricular contractions)     Recurrent cholesteatoma of postmastoidectomy cavity        PAST SURGICAL HISTORY:  Past Surgical History:   Procedure Laterality Date    ABDOMEN SURGERY      intestial surgery as an infant    EP ABLATION PVC  03/08/2017    Ablation of RV outflow tract PVC    GI SURGERY  1978    MASTOIDECTOMY      TYMPANOPLASTY  3/1/1996       FAMILY HISTORY:  Family History   Problem Relation Age of Onset    Kidney Disease Mother     Diabetes Father         type 2    Hypertension Father     Myocardial Infarction Father     Heart Disease Father     Heart Surgery Father 52        bypass    Arrhythmia Father     Obesity Father     Family History Negative Maternal Grandmother     Myocardial Infarction Maternal Grandfather     Family History Negative Paternal Grandmother     Family History Negative Paternal Grandfather     Family History Negative Brother        SOCIAL HISTORY:  Social History     Socioeconomic History    Marital status:      Spouse name: None    Number of children: None    Years of education: None    Highest education level: None   Occupational History    Occupation: Sales - Software.    Tobacco Use    Smoking status: Never    Smokeless tobacco: Never   Substance and Sexual Activity    Alcohol use: Yes     Comment: 1 glass wine, 2 days week     Drug use: Never    Sexual activity: Yes     Partners: Female     Birth control/protection: Condom   Other Topics Concern     Parent/sibling w/ CABG, MI or angioplasty before 65F 55M? Yes    Caffeine Concern No     Comment: 1 cup per week    Sleep Concern No    Stress Concern Yes    Weight Concern No    Special Diet No    Exercise Yes     Comment: runs 1-2 days week     Social Determinants of Health     Financial Resource Strain: Low Risk  (12/21/2023)    Financial Resource Strain     Within the past 12 months, have you or your family members you live with been unable to get utilities (heat, electricity) when it was really needed?: No   Food Insecurity: Low Risk  (12/21/2023)    Food Insecurity     Within the past 12 months, did you worry that your food would run out before you got money to buy more?: No     Within the past 12 months, did the food you bought just not last and you didn t have money to get more?: No   Transportation Needs: Low Risk  (12/21/2023)    Transportation Needs     Within the past 12 months, has lack of transportation kept you from medical appointments, getting your medicines, non-medical meetings or appointments, work, or from getting things that you need?: No   Interpersonal Safety: Low Risk  (12/21/2023)    Interpersonal Safety     Do you feel physically and emotionally safe where you currently live?: Yes     Within the past 12 months, have you been hit, slapped, kicked or otherwise physically hurt by someone?: No     Within the past 12 months, have you been humiliated or emotionally abused in other ways by your partner or ex-partner?: No   Housing Stability: Low Risk  (12/21/2023)    Housing Stability     Do you have housing? : Yes     Are you worried about losing your housing?: No       Review of Systems:  Skin:          Eyes:         ENT:         Respiratory:  Negative       Cardiovascular:  Negative;palpitations;chest pain;syncope or near-syncope;dizziness;cyanosis;edema;exercise intolerance lightheadedness energy level low  Gastroenterology:        Genitourinary:         Musculoskeletal:         Neurologic:    "      Psychiatric:  Positive for depression started lexapro recently  Heme/Lymph/Imm:         Endocrine:           Physical Exam:  Vitals: /85   Pulse 64   Ht 1.93 m (6' 4\")   Wt 117.4 kg (258 lb 14.4 oz)   BMI 31.51 kg/m        CC  Amadeo Marquis PA-C  [address]  [place] [address]                  Thank you for allowing me to participate in the care of your patient.      Sincerely,     Jose Cruz Lopez MD     [facility]  "

## 2024-01-29 NOTE — PROGRESS NOTES
HPI and Plan:   I the pleasure of seeing Earnest Peterson in cardiology consultation at request of her primary care provider for cardiovascular screening and family history of ascending aorta aneurysm.    He was seen by me in 2019 for history of premature ventricular complexes.  At that time he was noted to have RVOT PVCs and underwent successful ablation in 2017.  He also had an MRI in the past which had shown some delayed enhancement in nonischemic pattern and was thought to be related to prior myocarditis.    Recently, he was informed that 3 of his aunts have been diagnosed with ascending aorta aneurysm and dissection.  These are all on his father side.  He was concerned about that and talk to his primary care provider and she referred him back to us.  This was also done because his father  recently.  In his 50s, his father had atrial fibrillation and coronary artery disease including heart attack as well as bypass.    He does have hyperlipidemia.  His last LDL was 115 in December of this year.  HDL was 46.  He has had previous echocardiograms few years ago and did not show any aortic root or ascending aorta enlargement at that time.  Ejection fraction was normal.    He has had no recurrent palpitations or skipped beats.  No chest pain or shortness of breath.  He does not do any formal exercise but at least walks twice a week.    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    On exam, regular rate and rhythm.  No murmurs.  Chest was reauscultation.    EKG done today was revealing sinus rhythm without any ischemic changes.    Impression    1.  History of RVOT PVCs, s/p ablation in 2017    2.  Cardiovascular screening, family history of premature CAD with mild hyperlipidemia    3.  Family history of ascending aorta aneurysm in paternal aunts      Discussion    At this time, patient has no active cardiac symptoms.  He has no recurrent palpitations.  He is quite concerned about  his family history.  To further screen and for stratify, recommended a coronary calcium scan.  He understands a self-pay test if insurance does not cover it.  Involves a little bit of radiation.  Depending on his coronary calcium score, we will make further recommendations.  If there is abnormal coronary calcium, he will need statin to get his LDL below 100.  I will also check a lipoprotein a because of family history of premature CAD.    Given family history of ascending aorta aneurysm and dissection, will obtain an thoracic MRA to assess for aortic size.    We will call him with the results of the above test and further recommendations.  Thank you for allowing us to participate in care of this nice patient.    Sincerely,    Jose Cruz Lopez MD    Today's clinic visit entailed:  Review of external notes as documented elsewhere in note  Review of the result(s) of each unique test - lipid profile, prior CMR, ekg  Ordering of each unique test    Provider  Link to [facility] Help Grid     The level of medical decision making during this visit was of moderate complexity.      Orders Placed This Encounter   Procedures    EKG 12-lead complete w/read - Clinics (performed today)       Orders Placed This Encounter   Medications    ibuprofen (ADVIL/MOTRIN) 200 MG tablet     Sig: Take 200 mg by mouth daily as needed for pain       There are no discontinued medications.      Encounter Diagnosis   Name Primary?    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)        CURRENT MEDICATIONS:  Current Outpatient Medications   Medication Sig Dispense Refill    escitalopram (LEXAPRO) 10 MG tablet Take 1 tablet (10 mg) by mouth daily 30 tablet 3    ibuprofen (ADVIL/MOTRIN) 200 MG tablet Take 200 mg by mouth daily as needed for pain      mometasone (NASONEX) 50 MCG/ACT nasal spray Spray 2 sprays into both nostrils daily         ALLERGIES     Allergies   Allergen Reactions    Nkda [No Known Drug Allergy]        PAST  MEDICAL HISTORY:  Past Medical History:   Diagnosis Date    Allergic rhinitis     Generalized anxiety disorder 10/29/2014     Diagnosis updated by automated process. Provider to review and confirm.    Heart rate problem 10/1/2015    Moderate major depression (H) 10/29/2014    PVC's (premature ventricular contractions)     Recurrent cholesteatoma of postmastoidectomy cavity        PAST SURGICAL HISTORY:  Past Surgical History:   Procedure Laterality Date    ABDOMEN SURGERY      intestial surgery as an infant    EP ABLATION PVC  03/08/2017    Ablation of RV outflow tract PVC    GI SURGERY  1978    MASTOIDECTOMY      TYMPANOPLASTY  3/1/1996       FAMILY HISTORY:  Family History   Problem Relation Age of Onset    Kidney Disease Mother     Diabetes Father         type 2    Hypertension Father     Myocardial Infarction Father     Heart Disease Father     Heart Surgery Father 52        bypass    Arrhythmia Father     Obesity Father     Family History Negative Maternal Grandmother     Myocardial Infarction Maternal Grandfather     Family History Negative Paternal Grandmother     Family History Negative Paternal Grandfather     Family History Negative Brother        SOCIAL HISTORY:  Social History     Socioeconomic History    Marital status:      Spouse name: None    Number of children: None    Years of education: None    Highest education level: None   Occupational History    Occupation: Sales - Software.    Tobacco Use    Smoking status: Never    Smokeless tobacco: Never   Substance and Sexual Activity    Alcohol use: Yes     Comment: 1 glass wine, 2 days week     Drug use: Never    Sexual activity: Yes     Partners: Female     Birth control/protection: Condom   Other Topics Concern    Parent/sibling w/ CABG, MI or angioplasty before 65F 55M? Yes    Caffeine Concern No     Comment: 1 cup per week    Sleep Concern No    Stress Concern Yes    Weight Concern No    Special Diet No    Exercise Yes     Comment: runs  "1-2 days week     Social Determinants of Health     Financial Resource Strain: Low Risk  (12/21/2023)    Financial Resource Strain     Within the past 12 months, have you or your family members you live with been unable to get utilities (heat, electricity) when it was really needed?: No   Food Insecurity: Low Risk  (12/21/2023)    Food Insecurity     Within the past 12 months, did you worry that your food would run out before you got money to buy more?: No     Within the past 12 months, did the food you bought just not last and you didn t have money to get more?: No   Transportation Needs: Low Risk  (12/21/2023)    Transportation Needs     Within the past 12 months, has lack of transportation kept you from medical appointments, getting your medicines, non-medical meetings or appointments, work, or from getting things that you need?: No   Interpersonal Safety: Low Risk  (12/21/2023)    Interpersonal Safety     Do you feel physically and emotionally safe where you currently live?: Yes     Within the past 12 months, have you been hit, slapped, kicked or otherwise physically hurt by someone?: No     Within the past 12 months, have you been humiliated or emotionally abused in other ways by your partner or ex-partner?: No   Housing Stability: Low Risk  (12/21/2023)    Housing Stability     Do you have housing? : Yes     Are you worried about losing your housing?: No       Review of Systems:  Skin:          Eyes:         ENT:         Respiratory:  Negative       Cardiovascular:  Negative;palpitations;chest pain;syncope or near-syncope;dizziness;cyanosis;edema;exercise intolerance lightheadedness energy level low  Gastroenterology:        Genitourinary:         Musculoskeletal:         Neurologic:         Psychiatric:  Positive for depression started lexapro recently  Heme/Lymph/Imm:         Endocrine:           Physical Exam:  Vitals: /85   Pulse 64   Ht 1.93 m (6' 4\")   Wt 117.4 kg (258 lb 14.4 oz)   BMI 31.51 " kg/m        CC  Amadeo Marquis PA-C  [address] [address] [address]  [place] [address]

## 2024-01-31 ENCOUNTER — LAB (OUTPATIENT)
Dept: LAB | Facility: CLINIC | Age: 44
End: 2024-01-31
Payer: COMMERCIAL

## 2024-01-31 DIAGNOSIS — E78.5 HYPERLIPIDEMIA LDL GOAL <100: ICD-10-CM

## 2024-01-31 PROCEDURE — 83695 ASSAY OF LIPOPROTEIN(A): CPT | Performed by: INTERNAL MEDICINE

## 2024-01-31 PROCEDURE — 36415 COLL VENOUS BLD VENIPUNCTURE: CPT | Performed by: INTERNAL MEDICINE

## 2024-02-01 LAB — APO A-I SERPL-MCNC: 9 MG/DL

## 2024-02-15 ENCOUNTER — HOSPITAL ENCOUNTER (OUTPATIENT)
Dept: CARDIOLOGY | Facility: CLINIC | Age: 44
Discharge: HOME | End: 2024-02-15
Attending: INTERNAL MEDICINE | Admitting: INTERNAL MEDICINE
Payer: COMMERCIAL

## 2024-02-15 DIAGNOSIS — E78.5 HYPERLIPIDEMIA LDL GOAL <100: ICD-10-CM

## 2024-02-15 PROCEDURE — 75571 CT HRT W/O DYE W/CA TEST: CPT

## 2024-02-15 PROCEDURE — 75571 CT HRT W/O DYE W/CA TEST: CPT | Mod: 26 | Performed by: INTERNAL MEDICINE

## 2024-02-16 ENCOUNTER — TELEPHONE (OUTPATIENT)
Dept: CARDIOLOGY | Facility: CLINIC | Age: 44
End: 2024-02-16

## 2024-02-16 ENCOUNTER — HOSPITAL ENCOUNTER (OUTPATIENT)
Dept: CARDIOLOGY | Facility: CLINIC | Age: 44
Discharge: HOME | End: 2024-02-16
Attending: INTERNAL MEDICINE | Admitting: INTERNAL MEDICINE
Payer: COMMERCIAL

## 2024-02-16 DIAGNOSIS — Z82.49 FAMILY HISTORY OF THORACIC AORTIC ANEURYSM: ICD-10-CM

## 2024-02-16 PROCEDURE — 71555 MRI ANGIO CHEST W OR W/O DYE: CPT | Mod: 26 | Performed by: INTERNAL MEDICINE

## 2024-02-16 PROCEDURE — 71555 MRI ANGIO CHEST W OR W/O DYE: CPT

## 2024-02-16 PROCEDURE — 255N000002 HC RX 255 OP 636: Performed by: INTERNAL MEDICINE

## 2024-02-16 PROCEDURE — A9585 GADOBUTROL INJECTION: HCPCS | Performed by: INTERNAL MEDICINE

## 2024-02-16 RX ORDER — ONDANSETRON 2 MG/ML
4 INJECTION INTRAMUSCULAR; INTRAVENOUS
Status: DISCONTINUED | OUTPATIENT
Start: 2024-02-16 | End: 2024-02-17 | Stop reason: HOSPADM

## 2024-02-16 RX ORDER — DIAZEPAM 5 MG
5 TABLET ORAL EVERY 30 MIN PRN
Status: DISCONTINUED | OUTPATIENT
Start: 2024-02-16 | End: 2024-02-17 | Stop reason: HOSPADM

## 2024-02-16 RX ORDER — METHYLPREDNISOLONE SODIUM SUCCINATE 125 MG/2ML
125 INJECTION, POWDER, LYOPHILIZED, FOR SOLUTION INTRAMUSCULAR; INTRAVENOUS
Status: DISCONTINUED | OUTPATIENT
Start: 2024-02-16 | End: 2024-02-17 | Stop reason: HOSPADM

## 2024-02-16 RX ORDER — REGADENOSON 0.08 MG/ML
0.4 INJECTION, SOLUTION INTRAVENOUS ONCE
Status: DISCONTINUED | OUTPATIENT
Start: 2024-02-16 | End: 2024-02-17 | Stop reason: HOSPADM

## 2024-02-16 RX ORDER — AMINOPHYLLINE 25 MG/ML
100 INJECTION, SOLUTION INTRAVENOUS ONCE
Status: DISCONTINUED | OUTPATIENT
Start: 2024-02-16 | End: 2024-02-17 | Stop reason: HOSPADM

## 2024-02-16 RX ORDER — DIPHENHYDRAMINE HYDROCHLORIDE 50 MG/ML
25-50 INJECTION INTRAMUSCULAR; INTRAVENOUS
Status: DISCONTINUED | OUTPATIENT
Start: 2024-02-16 | End: 2024-02-17 | Stop reason: HOSPADM

## 2024-02-16 RX ORDER — ACYCLOVIR 200 MG/1
0-1 CAPSULE ORAL
Status: DISCONTINUED | OUTPATIENT
Start: 2024-02-16 | End: 2024-02-17 | Stop reason: HOSPADM

## 2024-02-16 RX ORDER — ALBUTEROL SULFATE 90 UG/1
2 AEROSOL, METERED RESPIRATORY (INHALATION) EVERY 5 MIN PRN
Status: DISCONTINUED | OUTPATIENT
Start: 2024-02-16 | End: 2024-02-17 | Stop reason: HOSPADM

## 2024-02-16 RX ORDER — DIPHENHYDRAMINE HCL 25 MG
25 CAPSULE ORAL
Status: DISCONTINUED | OUTPATIENT
Start: 2024-02-16 | End: 2024-02-17 | Stop reason: HOSPADM

## 2024-02-16 RX ORDER — CAFFEINE CITRATE 20 MG/ML
60 SOLUTION INTRAVENOUS
Status: DISCONTINUED | OUTPATIENT
Start: 2024-02-16 | End: 2024-02-17 | Stop reason: HOSPADM

## 2024-02-16 RX ORDER — GADOBUTROL 604.72 MG/ML
23 INJECTION INTRAVENOUS ONCE
Status: COMPLETED | OUTPATIENT
Start: 2024-02-16 | End: 2024-02-16

## 2024-02-16 RX ADMIN — GADOBUTROL 23 ML: 604.72 INJECTION INTRAVENOUS at 09:06

## 2024-02-16 NOTE — TELEPHONE ENCOUNTER
Post office visit  MRI:  Clinical history: Family history of ascending aorta aneurysm.  Comparison MRA: None     1. The aortic root, ascending aorta, transverse arch and descending thoracic aorta are normal in size  without an aneurysm or dissection.     Aortic Size Index:   1.5 cm/m2     Aortic Height Index: 2.0 cm/m     2. The aortic arch is left sided. There is bovine branching of the arch vessels. There is no coarctation.     3. The main and proximal branch pulmonary arteries are normal in size.      4. The systemic venous connections are normal.         CONCLUSIONS:  Normal dimensions of the thoracic aorta.        Cta: FINDINGS:     Overall quality of the study: Adequate.      CORONARY ARTERY CALCIUM SCORES:      Left main coronary artery: 0  Left anterior descending coronary artery: 0  Circumflex coronary artery: 0   Right coronary artery: 0     TOTAL CALCIUM SCORE: 0     The total Agatston calcium score is 0     Narrative & Impression   RADIOLOGIST CONSULT FOR CARDIOLOGY 2/15/2024 8:50 AM     HISTORY: Hyperlipidemia LDL goal <100     COMPARISON: None.                                                                      IMPRESSION: The visualized lungs are clear.          Component      Latest Ref Rng 1/31/2024  3:26 PM   Lipoprotein (a)      <30 mg/dL 9        Left phone message for Pt all testing normal.  JUAN Curtis RN

## 2024-02-20 NOTE — TELEPHONE ENCOUNTER
Message left for patient to return call to clinic to discuss results at [phone].  Margarita Christensen RN on 2/20/2024 at 10:13 AM

## 2024-02-20 NOTE — TELEPHONE ENCOUNTER
Call patient with results.  Ascending aorta is normal in size.  Coronary calcium score is 0.  Lipoprotein a was also normal.  He can continue resector modification and follow-up with his primary care provider

## 2024-04-16 DIAGNOSIS — F43.21 ADJUSTMENT DISORDER WITH DEPRESSED MOOD: ICD-10-CM

## 2024-04-16 RX ORDER — ESCITALOPRAM OXALATE 10 MG/1
10 TABLET ORAL DAILY
Qty: 30 TABLET | Refills: 3 | Status: SHIPPED | OUTPATIENT
Start: 2024-04-16 | End: 2024-08-15

## 2024-06-06 ENCOUNTER — MYC MEDICAL ADVICE (OUTPATIENT)
Dept: FAMILY MEDICINE | Facility: CLINIC | Age: 44
End: 2024-06-06
Payer: COMMERCIAL

## 2024-06-11 NOTE — TELEPHONE ENCOUNTER
Mailed to home address, copied for scanning  [facility] Coordinator

## 2024-08-15 DIAGNOSIS — F43.21 ADJUSTMENT DISORDER WITH DEPRESSED MOOD: ICD-10-CM

## 2024-08-15 RX ORDER — ESCITALOPRAM OXALATE 10 MG/1
10 TABLET ORAL DAILY
Qty: 90 TABLET | Refills: 1 | Status: SHIPPED | OUTPATIENT
Start: 2024-08-15

## 2025-02-07 ENCOUNTER — TELEPHONE (OUTPATIENT)
Dept: OTOLARYNGOLOGY | Facility: CLINIC | Age: 45
End: 2025-02-07
Payer: COMMERCIAL

## 2025-02-07 NOTE — TELEPHONE ENCOUNTER
Health Call Center    Phone Message    May a detailed message be left on voicemail: yes     Reason for Call: Appointment Intake    Referring Provider Name: Amadeo Marquis PA-C  Diagnosis and/or Symptoms: Z86.69 (ICD-10-CM) - History of cholesteatoma. Pt has seen provider in the past and would like to be scheduled with her. Please call to advise. Did confirm phone number and insurance. Thanks     Action Taken: Message routed to:  [facility] ENT    Travel Screening: Not Applicable     Date of Service:

## 2025-02-10 NOTE — TELEPHONE ENCOUNTER
LVM to schedule per referral. Patient has 2 options as Dr Petersen is leaving      Schedule RTN EAR visit with Diane or Sandy with audio prior. They can determine if patient should schedule a visit with Dr. Enriquez after  Or       2. Schedule NEW EAR with Mina or debbie with audio prior if patient doesn't want to see Sandy Contreras        Gave ENT number

## 2025-02-14 DIAGNOSIS — F43.21 ADJUSTMENT DISORDER WITH DEPRESSED MOOD: ICD-10-CM

## 2025-02-18 RX ORDER — ESCITALOPRAM OXALATE 10 MG/1
10 TABLET ORAL DAILY
Qty: 90 TABLET | Refills: 1 | Status: SHIPPED | OUTPATIENT
Start: 2025-02-18

## 2025-03-23 ENCOUNTER — ANCILLARY PROCEDURE (OUTPATIENT)
Dept: GENERAL RADIOLOGY | Facility: CLINIC | Age: 45
End: 2025-03-23
Attending: EMERGENCY MEDICINE
Payer: COMMERCIAL

## 2025-03-23 ENCOUNTER — OFFICE VISIT (OUTPATIENT)
Dept: URGENT CARE | Facility: URGENT CARE | Age: 45
End: 2025-03-23
Payer: COMMERCIAL

## 2025-03-23 VITALS
WEIGHT: 251 LBS | SYSTOLIC BLOOD PRESSURE: 122 MMHG | BODY MASS INDEX: 33.34 KG/M2 | TEMPERATURE: 98.2 F | HEART RATE: 82 BPM | OXYGEN SATURATION: 96 % | DIASTOLIC BLOOD PRESSURE: 84 MMHG | RESPIRATION RATE: 16 BRPM

## 2025-03-23 DIAGNOSIS — S69.92XA INJURY OF LEFT LITTLE FINGER, INITIAL ENCOUNTER: ICD-10-CM

## 2025-03-23 DIAGNOSIS — S63.637A SPRAIN OF INTERPHALANGEAL JOINT OF LEFT LITTLE FINGER, INITIAL ENCOUNTER: Primary | ICD-10-CM

## 2025-03-23 PROCEDURE — 3079F DIAST BP 80-89 MM HG: CPT | Performed by: EMERGENCY MEDICINE

## 2025-03-23 PROCEDURE — 99214 OFFICE O/P EST MOD 30 MIN: CPT | Performed by: EMERGENCY MEDICINE

## 2025-03-23 PROCEDURE — 3074F SYST BP LT 130 MM HG: CPT | Performed by: EMERGENCY MEDICINE

## 2025-03-23 PROCEDURE — 73140 X-RAY EXAM OF FINGER(S): CPT | Mod: TC | Performed by: RADIOLOGY

## 2025-03-23 NOTE — PROGRESS NOTES
Assessment & Plan     Diagnosis:    ICD-10-CM    1. Sprain of interphalangeal joint of left little finger, initial encounter  S63.637A XR Finger Left G/E 2 Views        Medical Decision Making:  Earnest Peterson is a 44 year old male presents for evaluation of left little finger pain/swelling.  Signs and symptoms are consistent with a PIP joint sprain.  A broad differential was considered including sprain, strain, fracture, tendon rupture, nerve impingement/compromise, referred pain. Supportive outpatient management is indicated.  X-ray negative for fracture or malalignment on my read.  No signs of ligamentous instability.  Patient has a simple AlumaFoam splint and was recommended to continue to wear this, PRICE therapy, follow-up with orthopedics in 1 week if not improving.  Questions answered.    Martin Jackson PA-C  [facility]    Subjective     Earnest Peterson is a 44 year old male who presents to clinic today for the following health issues:  Chief Complaint   Patient presents with    Urgent Care     PINKY JAM X 5 DAYS WITH NO KNOWN INJURY       HPI  Patient with 5 days of left pinky pain, slight swelling near the PIP joint. Not sure what he did exactly but feels like he jammed it.  He does not recall a specific injury.  He is noticed a little bit of bruising going towards the tip of his finger now and increased pain and swelling at the PIP joint.  He still has pretty much normal range of motion of the finger, does feel a bit stiff.  No fevers, red streaks going up into the hand, fevers, rashes, chest pain, shortness of breath or other concerns.    Review of Systems    See HPI    Objective      Vitals: /84 (BP Location: Right arm, Patient Position: Sitting, Cuff Size: Adult Regular)   Pulse 82   Temp 98.2  F (36.8  C) (Tympanic)   Resp 16   Wt 113.9 kg (251 lb)   SpO2 96%   BMI 33.34 kg/m        Patient Vitals for the past 24 hrs:   BP Temp Temp src Pulse Resp SpO2 Weight    03/23/25 1542 122/84 98.2  F (36.8  C) Tympanic 82 16 96 % 113.9 kg (251 lb)       Vital signs reviewed by: Martin Jackson PA-C    Physical Exam:  Constitutional: Patient is alert and cooperative. No acute distress.  Neurological: Alert and oriented x3.   MSK/Skin: Left pinky finger with slight swelling and tenderness near the medial aspect of the PIP joint.  Range of motion is normal at the MCP, PIP and DIP joints.  There is faint bruising to the tip of the finger.  No tenderness in this region.  No erythema, warmth, fluctuance or areas of pointing.  No lymphangitis.  Psychiatric:The patient has a normal mood and affect.     Martin Jackson PA-C, March 23, 2025

## 2025-03-27 ENCOUNTER — OFFICE VISIT (OUTPATIENT)
Dept: AUDIOLOGY | Facility: CLINIC | Age: 45
End: 2025-03-27
Payer: COMMERCIAL

## 2025-03-27 DIAGNOSIS — H90.12 CONDUCTIVE HEARING LOSS OF LEFT EAR WITH UNRESTRICTED HEARING OF RIGHT EAR: Primary | ICD-10-CM

## 2025-03-27 NOTE — PROGRESS NOTES
AUDIOLOGY REPORT    SUMMARY: Audiology visit completed. See audiogram for results.      RECOMMENDATIONS: Follow-up with ENT.    Kael Walden, [facility]  Licensed Audiologist  [place] #[id number]

## (undated) RX ORDER — LIDOCAINE HYDROCHLORIDE 20 MG/ML
INJECTION, SOLUTION INFILTRATION; PERINEURAL
Status: DISPENSED
Start: 2019-06-20

## (undated) RX ORDER — FENTANYL CITRATE 50 UG/ML
INJECTION, SOLUTION INTRAMUSCULAR; INTRAVENOUS
Status: DISPENSED
Start: 2017-03-08

## (undated) RX ORDER — LIDOCAINE HYDROCHLORIDE AND EPINEPHRINE 10; 10 MG/ML; UG/ML
INJECTION, SOLUTION INFILTRATION; PERINEURAL
Status: DISPENSED
Start: 2017-11-02

## (undated) RX ORDER — HEPARIN SODIUM 1000 [USP'U]/ML
INJECTION, SOLUTION INTRAVENOUS; SUBCUTANEOUS
Status: DISPENSED
Start: 2017-03-08